# Patient Record
Sex: FEMALE | Race: ASIAN | NOT HISPANIC OR LATINO | URBAN - METROPOLITAN AREA
[De-identification: names, ages, dates, MRNs, and addresses within clinical notes are randomized per-mention and may not be internally consistent; named-entity substitution may affect disease eponyms.]

---

## 2024-01-09 ENCOUNTER — OFFICE VISIT (OUTPATIENT)
Dept: OPTOMETRY | Facility: CLINIC | Age: 29
End: 2024-01-09
Payer: COMMERCIAL

## 2024-01-09 DIAGNOSIS — Z98.890 HISTORY OF REPAIR OF RETINAL DEFECT BY LASER PHOTOCOAGULATION: ICD-10-CM

## 2024-01-09 DIAGNOSIS — H35.411 LATTICE DEGENERATION OF PERIPHERAL RETINA, RIGHT: ICD-10-CM

## 2024-01-09 DIAGNOSIS — H04.123 DRY EYE SYNDROME, BILATERAL: ICD-10-CM

## 2024-01-09 DIAGNOSIS — H52.13 MYOPIA, BILATERAL: Primary | ICD-10-CM

## 2024-01-09 PROCEDURE — 92015 DETERMINE REFRACTIVE STATE: CPT | Mod: S$GLB,,, | Performed by: OPTOMETRIST

## 2024-01-09 PROCEDURE — 99999 PR PBB SHADOW E&M-NEW PATIENT-LVL II: CPT | Mod: PBBFAC,,, | Performed by: OPTOMETRIST

## 2024-01-09 PROCEDURE — 92004 COMPRE OPH EXAM NEW PT 1/>: CPT | Mod: S$GLB,,, | Performed by: OPTOMETRIST

## 2024-01-09 RX ORDER — ACETAMINOPHEN AND CODEINE PHOSPHATE 120; 12 MG/5ML; MG/5ML
1 SOLUTION ORAL
COMMUNITY

## 2024-01-09 RX ORDER — CYCLOSPORINE 0.5 MG/ML
1 EMULSION OPHTHALMIC 2 TIMES DAILY
Qty: 180 EACH | Refills: 3 | Status: SHIPPED | OUTPATIENT
Start: 2024-01-09 | End: 2025-01-08

## 2024-01-09 NOTE — PROGRESS NOTES
HPI    RADHA: 7-8 months    CC: Pt is here today for a routine eye exam. Pt is here to establish care.   Pt states that she had been diagnosed with a a retinal disease had retinal   laser procedure done in [place].    (-)Dryness  (-)Burning  (+)Itchiness  (-)Tearing  (-)Flashes  (+)Floaters - occasionally  (-)Photophobia  (+)Eye Pain - occasionally      Diabetic: no    Contact Lens: no    Eye Meds: Refresh QD - PRN     Ocular History:  Retinal laser procedure done 1 year ago     Last edited by Kacey Gordon MA on 1/9/2024  8:58 AM.            Assessment /Plan     For exam results, see Encounter Report.    Myopia, bilateral   Rx specs    Lattice degeneration of peripheral retina, right  History of repair of retinal defect by laser photocoagulation     Dry eye syndrome, bilateral  -Start     cycloSPORINE (RESTASIS) 0.05 % ophthalmic emulsion; Place 1 drop into both eyes 2 (two) times daily.  Dispense: 180 each; Refill: 3      RTC 1 year, sooner PRN

## 2024-01-23 ENCOUNTER — OFFICE VISIT (OUTPATIENT)
Dept: DERMATOLOGY | Facility: CLINIC | Age: 29
End: 2024-01-23
Payer: COMMERCIAL

## 2024-01-23 DIAGNOSIS — L64.9 ANDROGENIC ALOPECIA: Primary | ICD-10-CM

## 2024-01-23 PROCEDURE — 99204 OFFICE O/P NEW MOD 45 MIN: CPT | Mod: S$GLB,,, | Performed by: DERMATOLOGY

## 2024-01-23 PROCEDURE — 1159F MED LIST DOCD IN RCRD: CPT | Mod: CPTII,S$GLB,, | Performed by: DERMATOLOGY

## 2024-01-23 PROCEDURE — 99999 PR PBB SHADOW E&M-EST. PATIENT-LVL III: CPT | Mod: PBBFAC,,, | Performed by: DERMATOLOGY

## 2024-01-23 RX ORDER — LEVOTHYROXINE SODIUM 25 UG/1
25 TABLET ORAL
COMMUNITY

## 2024-01-23 RX ORDER — SPIRONOLACTONE 50 MG/1
TABLET, FILM COATED ORAL
Qty: 60 TABLET | Refills: 3 | Status: SHIPPED | OUTPATIENT
Start: 2024-01-23 | End: 2024-06-18

## 2024-01-23 NOTE — PATIENT INSTRUCTIONS
Counseled the patient that hair loss is a long-term problem the 1st goal will be maintenance of hair present the 2nd goal will be hair growth  Start Men's otc Rogaine or minoxidil  once daily to scalp 5% solution or foam (can find 6 mo supply at [facility] or [employer])  Use vaseline around hairline to prevent excessive hair growth  Encourage natural (chemical and heat-free) hair styles and limited styling products.  Counseling done on chronic nature of hair loss and that at least a 6 month trial must be done before stopping minoxidil  Pictures taken of hair loss today will compare through visit hx     Discussed benefits and risks of therapy including but not limited to breakthrough bleeding, breast tenderness, and elevated potassium levels which may give symptoms of fatigue, palpitations, and nausea. Patient should limit potassium intake - avoid potassium supplements or salt substitutes, limit bananas and citrus fruits. Pregnancy must be avoided while taking spironolactone.      Viviscal shampoo and conditioner try to help with hair loss    [facility] and [employer]~ 17$ 6 mo supply

## 2024-01-23 NOTE — PROGRESS NOTES
"  Subjective:      Patient ID:  Susan Corona is a 28 y.o. female who presents for   Chief Complaint   Patient presents with    Hair Loss     Initial-scalp     28 y.o. female presents today for hair loss.    New patient    Hair loss- initial  Duration: 10 years  Itching (scale 1-10): none per pt  Current tx: Folimax scalp serum  Prior tx: same as current tx  Personal or family history of autoimmune disease (i.e. thyroid, lupus): hypothyroidism    Pertinent labs:  No results found for: "TSH"  No results found for: "WBC", "HGB", "HCT", "MCV", "PLT"    No results found for: "UIBC", "IRON", "TRANS", "TRANSFERRIN", "TIBC", "LABIRON", "FESATURATED"   No results found for: "ZINC", "[id number]"          Review of Systems   Hematologic/Lymphatic: Does not bruise/bleed easily.       Objective:   Physical Exam   Constitutional: She appears well-developed and well-nourished. No distress.   Neurological: She is alert and oriented to person, place, and time. She is not disoriented.   Psychiatric: She has a normal mood and affect.   Skin:   Areas Examined (abnormalities noted in diagram):   Scalp / Hair Palpated and Inspected            Diagram Legend     Erythematous scaling macule/papule c/w actinic keratosis       Vascular papule c/w angioma      Pigmented verrucoid papule/plaque c/w seborrheic keratosis      Yellow umbilicated papule c/w sebaceous hyperplasia      Irregularly shaped tan macule c/w lentigo     1-2 mm smooth white papules consistent with Milia      Movable subcutaneous cyst with punctum c/w epidermal inclusion cyst      Subcutaneous movable cyst c/w pilar cyst      Firm pink to brown papule c/w dermatofibroma      Pedunculated fleshy papule(s) c/w skin tag(s)      Evenly pigmented macule c/w junctional nevus     Mildly variegated pigmented, slightly irregular-bordered macule c/w mildly atypical nevus      Flesh colored to evenly pigmented papule c/w intradermal nevus       Pink pearly papule/plaque c/w basal " cell carcinoma      Erythematous hyperkeratotic cursted plaque c/w SCC      Surgical scar with no sign of skin cancer recurrence      Open and closed comedones      Inflammatory papules and pustules      Verrucoid papule consistent consistent with wart     Erythematous eczematous patches and plaques     Dystrophic onycholytic nail with subungual debris c/w onychomycosis     Umbilicated papule    Erythematous-base heme-crusted tan verrucoid plaque consistent with inflamed seborrheic keratosis     Erythematous Silvery Scaling Plaque c/w Psoriasis     See annotation      Assessment / Plan:        Androgenic alopecia  -     spironolactone (ALDACTONE) 50 MG tablet; Start with 1 po qday, increase to 2 po qday as tolerated  Dispense: 60 tablet; Refill: 3    Counseled the patient that hair loss is a long-term problem the 1st goal will be maintenance of hair present the 2nd goal will be hair growth  Start Men's otc Rogaine or minoxidil  once daily to scalp 5% solution or foam (can find 6 mo supply at [facility] or [employer])  Use vaseline around hairline to prevent excessive hair growth  Encourage natural (chemical and heat-free) hair styles and limited styling products.  Counseling done on chronic nature of hair loss and that at least a 6 month trial must be done before stopping minoxidil  Pictures taken of hair loss today will compare through visit hx     Discussed benefits and risks of therapy including but not limited to breakthrough bleeding, breast tenderness, and elevated potassium levels which may give symptoms of fatigue, palpitations, and nausea. Patient should limit potassium intake - avoid potassium supplements or salt substitutes, limit bananas and citrus fruits. Pregnancy must be avoided while taking spironolactone.      Viviscal shampoo and conditioner try to help with hair loss    All shampoo is sulfate and paraben free          Follow up in about 6 months (around 7/23/2024) for hair loss med mangement.

## 2024-04-10 ENCOUNTER — TELEPHONE (OUTPATIENT)
Dept: OPTOMETRY | Facility: CLINIC | Age: 29
End: 2024-04-10
Payer: COMMERCIAL

## 2024-04-10 DIAGNOSIS — H04.123 DRY EYE SYNDROME, BILATERAL: Primary | ICD-10-CM

## 2024-04-10 RX ORDER — CYCLOSPORINE 0.5 MG/ML
1 EMULSION OPHTHALMIC 2 TIMES DAILY
Qty: 180 EACH | Refills: 3 | Status: SHIPPED | OUTPATIENT
Start: 2024-04-10 | End: 2025-04-10

## 2024-04-10 NOTE — TELEPHONE ENCOUNTER
Spoke to pt in regards to eye pain. Informed pt that she needs to use the restasis drops that Dr. Jhaveri prescribed to her to improve her eye pain caused by her dry eyes. Pt expressed understanding and requested Rx be sent to [facility].

## 2024-05-16 ENCOUNTER — OFFICE VISIT (OUTPATIENT)
Dept: INTERNAL MEDICINE | Facility: CLINIC | Age: 29
End: 2024-05-16
Attending: FAMILY MEDICINE
Payer: COMMERCIAL

## 2024-05-16 ENCOUNTER — LAB VISIT (OUTPATIENT)
Dept: LAB | Facility: OTHER | Age: 29
End: 2024-05-16
Attending: FAMILY MEDICINE
Payer: COMMERCIAL

## 2024-05-16 VITALS
SYSTOLIC BLOOD PRESSURE: 142 MMHG | WEIGHT: 124.13 LBS | BODY MASS INDEX: 24.37 KG/M2 | HEART RATE: 92 BPM | DIASTOLIC BLOOD PRESSURE: 72 MMHG | HEIGHT: 60 IN | OXYGEN SATURATION: 100 %

## 2024-05-16 DIAGNOSIS — E03.9 ACQUIRED HYPOTHYROIDISM: ICD-10-CM

## 2024-05-16 DIAGNOSIS — Z00.00 PREVENTATIVE HEALTH CARE: Primary | ICD-10-CM

## 2024-05-16 DIAGNOSIS — Z00.00 PREVENTATIVE HEALTH CARE: ICD-10-CM

## 2024-05-16 LAB
ALBUMIN SERPL BCP-MCNC: 4.3 G/DL (ref 3.5–5.2)
ALP SERPL-CCNC: 31 U/L (ref 55–135)
ALT SERPL W/O P-5'-P-CCNC: 21 U/L (ref 10–44)
ANION GAP SERPL CALC-SCNC: 11 MMOL/L (ref 8–16)
AST SERPL-CCNC: 19 U/L (ref 10–40)
BILIRUB SERPL-MCNC: 0.6 MG/DL (ref 0.1–1)
BUN SERPL-MCNC: 10 MG/DL (ref 6–20)
CALCIUM SERPL-MCNC: 9.3 MG/DL (ref 8.7–10.5)
CHLORIDE SERPL-SCNC: 108 MMOL/L (ref 95–110)
CHOLEST SERPL-MCNC: 258 MG/DL (ref 120–199)
CHOLEST/HDLC SERPL: 7 {RATIO} (ref 2–5)
CO2 SERPL-SCNC: 22 MMOL/L (ref 23–29)
CREAT SERPL-MCNC: 0.7 MG/DL (ref 0.5–1.4)
EST. GFR  (NO RACE VARIABLE): >60 ML/MIN/1.73 M^2
ESTIMATED AVG GLUCOSE: 108 MG/DL (ref 68–131)
GLUCOSE SERPL-MCNC: 68 MG/DL (ref 70–110)
HAV IGM SERPL QL IA: NORMAL
HBA1C MFR BLD: 5.4 % (ref 4–5.6)
HBV CORE IGM SERPL QL IA: NORMAL
HBV SURFACE AG SERPL QL IA: NORMAL
HCV AB SERPL QL IA: NEGATIVE
HDLC SERPL-MCNC: 37 MG/DL (ref 40–75)
HDLC SERPL: 14.3 % (ref 20–50)
HIV 1+2 AB+HIV1 P24 AG SERPL QL IA: NEGATIVE
LDLC SERPL CALC-MCNC: 162.4 MG/DL (ref 63–159)
NONHDLC SERPL-MCNC: 221 MG/DL
POTASSIUM SERPL-SCNC: 4 MMOL/L (ref 3.5–5.1)
PROT SERPL-MCNC: 7.8 G/DL (ref 6–8.4)
SODIUM SERPL-SCNC: 141 MMOL/L (ref 136–145)
TREPONEMA PALLIDUM IGG+IGM AB [PRESENCE] IN SERUM OR PLASMA BY IMMUNOASSAY: NONREACTIVE
TRIGL SERPL-MCNC: 293 MG/DL (ref 30–150)
TSH SERPL DL<=0.005 MIU/L-ACNC: 2.26 UIU/ML (ref 0.4–4)

## 2024-05-16 PROCEDURE — 87389 HIV-1 AG W/HIV-1&-2 AB AG IA: CPT | Performed by: FAMILY MEDICINE

## 2024-05-16 PROCEDURE — 3008F BODY MASS INDEX DOCD: CPT | Mod: CPTII,S$GLB,, | Performed by: FAMILY MEDICINE

## 2024-05-16 PROCEDURE — 83036 HEMOGLOBIN GLYCOSYLATED A1C: CPT | Performed by: FAMILY MEDICINE

## 2024-05-16 PROCEDURE — 99999 PR PBB SHADOW E&M-EST. PATIENT-LVL III: CPT | Mod: PBBFAC,,, | Performed by: FAMILY MEDICINE

## 2024-05-16 PROCEDURE — 84443 ASSAY THYROID STIM HORMONE: CPT | Performed by: FAMILY MEDICINE

## 2024-05-16 PROCEDURE — 36415 COLL VENOUS BLD VENIPUNCTURE: CPT | Performed by: FAMILY MEDICINE

## 2024-05-16 PROCEDURE — 3077F SYST BP >= 140 MM HG: CPT | Mod: CPTII,S$GLB,, | Performed by: FAMILY MEDICINE

## 2024-05-16 PROCEDURE — 3078F DIAST BP <80 MM HG: CPT | Mod: CPTII,S$GLB,, | Performed by: FAMILY MEDICINE

## 2024-05-16 PROCEDURE — 80061 LIPID PANEL: CPT | Performed by: FAMILY MEDICINE

## 2024-05-16 PROCEDURE — 1159F MED LIST DOCD IN RCRD: CPT | Mod: CPTII,S$GLB,, | Performed by: FAMILY MEDICINE

## 2024-05-16 PROCEDURE — 80074 ACUTE HEPATITIS PANEL: CPT | Performed by: FAMILY MEDICINE

## 2024-05-16 PROCEDURE — 80053 COMPREHEN METABOLIC PANEL: CPT | Performed by: FAMILY MEDICINE

## 2024-05-16 PROCEDURE — 86593 SYPHILIS TEST NON-TREP QUANT: CPT | Performed by: FAMILY MEDICINE

## 2024-05-16 PROCEDURE — 3044F HG A1C LEVEL LT 7.0%: CPT | Mod: CPTII,S$GLB,, | Performed by: FAMILY MEDICINE

## 2024-05-16 PROCEDURE — 1160F RVW MEDS BY RX/DR IN RCRD: CPT | Mod: CPTII,S$GLB,, | Performed by: FAMILY MEDICINE

## 2024-05-16 PROCEDURE — 99385 PREV VISIT NEW AGE 18-39: CPT | Mod: S$GLB,,, | Performed by: FAMILY MEDICINE

## 2024-05-16 NOTE — PROGRESS NOTES
CHIEF COMPLAINT: Establish a primary care physician    HISTORY OF PRESENT ILLNESS: The patient is a generally healthy 28 year-old female.  The patient has no specific complaints today.  It has been a while since the patient has seen a primary care physician.  The patient wishes to establish a primary care physician.  The patient would also like to get some basic blood work done.    Hypothyroid on low-dose of Synthroid    REVIEW OF SYSTEMS:  GENERAL: No fever, chills, fatigability or weight loss.  SKIN: No rashes, itching or changes in color or texture of skin.  HEAD: No headaches or recent head trauma.  EYES: Visual acuity fine. No photophobia, ocular pain or diplopia.  EARS: Denies ear pain, discharge or vertigo.  NOSE: No loss of smell, no epistaxis or postnasal drip.  MOUTH & THROAT: No hoarseness or change in voice. No excessive gum bleeding.  NODES: Denies swollen glands.  CHEST: Denies DEAN, cyanosis, wheezing, cough and sputum production.  CARDIOVASCULAR: Denies chest pain, PND, orthopnea or reduced exercise tolerance.  ABDOMEN: Appetite fine. No weight loss. Denies diarrhea, abdominal pain, hematemesis or blood in stool.  URINARY: No flank pain, dysuria or hematuria.  PERIPHERAL VASCULAR: No claudication or cyanosis.  MUSCULOSKELETAL: No joint stiffness or swelling. Denies back pain.  NEUROLOGIC: No history of seizures, paralysis, alteration of gait or coordination.    SOCIAL HISTORY: The patient does not smoke.  The patient consumes alcohol socially.  The patient is single.  Med surg RN here    PHYSICAL EXAMINATION:   Blood pressure (!) 142/72, pulse 92, height 5' (1.524 m), weight 56.3 kg (124 lb 1.9 oz), SpO2 100%.  APPEARANCE: Well nourished, well developed, in no acute distress.    HEAD: Normocephalic, atraumatic.  EYES: PERRL. EOMI.  Conjunctivae without injection and  anicteric  NOSE: Mucosa pink. Airway clear.  MOUTH & THROAT: No tonsillar enlargement. No pharyngeal erythema or exudate. No  stridor.  NECK: Supple.   NODES: No cervical, axillary or inguinal lymph node enlargement.  CHEST: Lungs clear to auscultation.  No retractions are noted.  No rales or rhonchi are present.  CARDIOVASCULAR: Normal S1, S2. No rubs, murmurs or gallops.  ABDOMEN: Bowel sounds normal. Not distended. Soft. No tenderness or masses.  No ascites is noted.  MUSCULOSKELETAL:  There is no clubbing, cyanosis, or edema of the extremities x4.  There is full range of motion of the lumbar spine.  There is full range of motion of the extremities x4.  There is no deformity noted.    NEUROLOGIC:       Normal speech development.      Hearing normal.      Normal gait.      Motor and sensory exams grossly normal.  PSYCHIATRIC: Patient is alert and oriented x3.  Thought processes are all normal.  There is no homicidality.  There is no suicidality.  There is no evidence of psychosis.    LABORATORY/RADIOLOGY:   Chart reviewed.  We will update blood work today.    ASSESSMENT:   Normal physical exam in an apparently healthy individual  Hypothyroid    PLAN:  We will follow-up blood work which we expect to be normal.    Would like to stop Synthroid and get BW in a couple months    Return to clinic in one year.

## 2024-06-17 ENCOUNTER — LAB VISIT (OUTPATIENT)
Dept: LAB | Facility: OTHER | Age: 29
End: 2024-06-17
Attending: FAMILY MEDICINE
Payer: COMMERCIAL

## 2024-06-17 ENCOUNTER — OFFICE VISIT (OUTPATIENT)
Dept: INTERNAL MEDICINE | Facility: CLINIC | Age: 29
End: 2024-06-17
Attending: FAMILY MEDICINE
Payer: COMMERCIAL

## 2024-06-17 ENCOUNTER — OFFICE VISIT (OUTPATIENT)
Dept: OBSTETRICS AND GYNECOLOGY | Facility: CLINIC | Age: 29
End: 2024-06-17
Attending: FAMILY MEDICINE
Payer: COMMERCIAL

## 2024-06-17 VITALS
SYSTOLIC BLOOD PRESSURE: 100 MMHG | OXYGEN SATURATION: 99 % | HEIGHT: 60 IN | WEIGHT: 122.25 LBS | DIASTOLIC BLOOD PRESSURE: 60 MMHG | BODY MASS INDEX: 24 KG/M2 | HEART RATE: 92 BPM

## 2024-06-17 VITALS
HEIGHT: 60 IN | DIASTOLIC BLOOD PRESSURE: 62 MMHG | BODY MASS INDEX: 23.85 KG/M2 | WEIGHT: 121.5 LBS | SYSTOLIC BLOOD PRESSURE: 108 MMHG

## 2024-06-17 DIAGNOSIS — Z32.01 ENCOUNTER FOR PREGNANCY TEST, RESULT POSITIVE: ICD-10-CM

## 2024-06-17 DIAGNOSIS — O21.9 NAUSEA/VOMITING IN PREGNANCY: ICD-10-CM

## 2024-06-17 DIAGNOSIS — Z12.4 SCREENING FOR CERVICAL CANCER: ICD-10-CM

## 2024-06-17 DIAGNOSIS — N91.2 AMENORRHEA: ICD-10-CM

## 2024-06-17 DIAGNOSIS — Z01.419 ENCOUNTER FOR ANNUAL ROUTINE GYNECOLOGICAL EXAMINATION: Primary | ICD-10-CM

## 2024-06-17 DIAGNOSIS — Z01.419 ENCOUNTER FOR ANNUAL ROUTINE GYNECOLOGICAL EXAMINATION: ICD-10-CM

## 2024-06-17 DIAGNOSIS — Z34.90 PREGNANCY, UNSPECIFIED GESTATIONAL AGE: ICD-10-CM

## 2024-06-17 DIAGNOSIS — E03.9 ACQUIRED HYPOTHYROIDISM: Primary | ICD-10-CM

## 2024-06-17 DIAGNOSIS — E03.9 ACQUIRED HYPOTHYROIDISM: ICD-10-CM

## 2024-06-17 LAB
HCG INTACT+B SERPL-ACNC: NORMAL MIU/ML
TSH SERPL DL<=0.005 MIU/L-ACNC: 2.02 UIU/ML (ref 0.4–4)

## 2024-06-17 PROCEDURE — 3044F HG A1C LEVEL LT 7.0%: CPT | Mod: CPTII,S$GLB,, | Performed by: FAMILY MEDICINE

## 2024-06-17 PROCEDURE — 3044F HG A1C LEVEL LT 7.0%: CPT | Mod: CPTII,S$GLB,, | Performed by: OBSTETRICS & GYNECOLOGY

## 2024-06-17 PROCEDURE — 3078F DIAST BP <80 MM HG: CPT | Mod: CPTII,S$GLB,, | Performed by: FAMILY MEDICINE

## 2024-06-17 PROCEDURE — 3008F BODY MASS INDEX DOCD: CPT | Mod: CPTII,S$GLB,, | Performed by: OBSTETRICS & GYNECOLOGY

## 2024-06-17 PROCEDURE — 3074F SYST BP LT 130 MM HG: CPT | Mod: CPTII,S$GLB,, | Performed by: FAMILY MEDICINE

## 2024-06-17 PROCEDURE — 87491 CHLMYD TRACH DNA AMP PROBE: CPT | Performed by: OBSTETRICS & GYNECOLOGY

## 2024-06-17 PROCEDURE — 99999 PR PBB SHADOW E&M-EST. PATIENT-LVL III: CPT | Mod: PBBFAC,,, | Performed by: FAMILY MEDICINE

## 2024-06-17 PROCEDURE — 3078F DIAST BP <80 MM HG: CPT | Mod: CPTII,S$GLB,, | Performed by: OBSTETRICS & GYNECOLOGY

## 2024-06-17 PROCEDURE — 84702 CHORIONIC GONADOTROPIN TEST: CPT | Performed by: FAMILY MEDICINE

## 2024-06-17 PROCEDURE — 1160F RVW MEDS BY RX/DR IN RCRD: CPT | Mod: CPTII,S$GLB,, | Performed by: OBSTETRICS & GYNECOLOGY

## 2024-06-17 PROCEDURE — 3008F BODY MASS INDEX DOCD: CPT | Mod: CPTII,S$GLB,, | Performed by: FAMILY MEDICINE

## 2024-06-17 PROCEDURE — 99214 OFFICE O/P EST MOD 30 MIN: CPT | Mod: S$GLB,,, | Performed by: FAMILY MEDICINE

## 2024-06-17 PROCEDURE — 99385 PREV VISIT NEW AGE 18-39: CPT | Mod: S$GLB,,, | Performed by: OBSTETRICS & GYNECOLOGY

## 2024-06-17 PROCEDURE — 84443 ASSAY THYROID STIM HORMONE: CPT | Performed by: FAMILY MEDICINE

## 2024-06-17 PROCEDURE — 99999 PR PBB SHADOW E&M-EST. PATIENT-LVL III: CPT | Mod: PBBFAC,,, | Performed by: OBSTETRICS & GYNECOLOGY

## 2024-06-17 PROCEDURE — 1159F MED LIST DOCD IN RCRD: CPT | Mod: CPTII,S$GLB,, | Performed by: OBSTETRICS & GYNECOLOGY

## 2024-06-17 PROCEDURE — 36415 COLL VENOUS BLD VENIPUNCTURE: CPT | Performed by: FAMILY MEDICINE

## 2024-06-17 PROCEDURE — 3074F SYST BP LT 130 MM HG: CPT | Mod: CPTII,S$GLB,, | Performed by: OBSTETRICS & GYNECOLOGY

## 2024-06-17 NOTE — PROGRESS NOTES
CHIEF COMPLAINT:  f/u    HISTORY OF PRESENT ILLNESS: The patient is a generally healthy 28 year-old female.  The patient has no specific complaints today.  The patient would also like to get some repeat blood work done. She is approximately 6 weeks pregnant and concerned about her thyroid disease status.  Hypothyroid off of Synthroid for about a month.    REVIEW OF SYSTEMS:  GENERAL: No fever, chills, fatigability or weight loss.  SKIN: No rashes, itching or changes in color or texture of skin.  HEAD: No headaches or recent head trauma.  EYES: Visual acuity fine. No photophobia, ocular pain or diplopia.  EARS: Denies ear pain, discharge or vertigo.  NOSE: No loss of smell, no epistaxis or postnasal drip.  MOUTH & THROAT: No hoarseness or change in voice. No excessive gum bleeding.  NODES: Denies swollen glands.  CHEST: Denies DEAN, cyanosis, wheezing, cough and sputum production.  CARDIOVASCULAR: Denies chest pain, PND, orthopnea or reduced exercise tolerance.  ABDOMEN: Appetite fine. No weight loss. Denies diarrhea, abdominal pain, hematemesis or blood in stool.  URINARY: No flank pain, dysuria or hematuria.  PERIPHERAL VASCULAR: No claudication or cyanosis.  MUSCULOSKELETAL: No joint stiffness or swelling. Denies back pain.  NEUROLOGIC: No history of seizures, paralysis, alteration of gait or coordination.    SOCIAL HISTORY: The patient does not smoke.  The patient consumes alcohol socially.  The patient is single.  Med surg RN here    PHYSICAL EXAMINATION:   Blood pressure 100/60, pulse 92, height 5' (1.524 m), weight 55.4 kg (122 lb 3.9 oz), SpO2 99%.  APPEARANCE: Well nourished, well developed, in no acute distress.    HEAD: Normocephalic, atraumatic.  EYES: PERRL. EOMI.  Conjunctivae without injection and  anicteric  NOSE: Mucosa pink. Airway clear.  MOUTH & THROAT: No tonsillar enlargement. No pharyngeal erythema or exudate. No stridor.  NECK: Supple.   NODES: No cervical, axillary or inguinal lymph node  enlargement.  CHEST: Lungs clear to auscultation.  No retractions are noted.  No rales or rhonchi are present.  CARDIOVASCULAR: Normal S1, S2. No rubs, murmurs or gallops.  ABDOMEN: Bowel sounds normal. Not distended. Soft. No tenderness or masses.  No ascites is noted.  MUSCULOSKELETAL:  There is no clubbing, cyanosis, or edema of the extremities x4.  There is full range of motion of the lumbar spine.  There is full range of motion of the extremities x4.  There is no deformity noted.    NEUROLOGIC:       Normal speech development.      Hearing normal.      Normal gait.      Motor and sensory exams grossly normal.  PSYCHIATRIC: Patient is alert and oriented x3.  Thought processes are all normal.  There is no homicidality.  There is no suicidality.  There is no evidence of psychosis.    LABORATORY/RADIOLOGY:   Chart reviewed.  We will update blood work today.    ASSESSMENT:   IUP 6 weeks  Hypothyroid off Synthroid    PLAN:  OB referral  Repeat TSH is normal even off Synthroid  Return to clinic in one year.    Answers submitted by the patient for this visit:  Review of Systems Questionnaire (Submitted on 6/16/2024)  activity change: Yes  unexpected weight change: No  neck pain: No  hearing loss: No  rhinorrhea: No  trouble swallowing: No  eye discharge: No  visual disturbance: No  chest tightness: No  wheezing: No  chest pain: No  palpitations: No  blood in stool: No  constipation: No  vomiting: Yes  diarrhea: No  polydipsia: No  polyuria: No  difficulty urinating: No  hematuria: No  menstrual problem: No  dysuria: No  joint swelling: No  arthralgias: No  headaches: No  weakness: No  confusion: No  dysphoric mood: No

## 2024-06-18 LAB
C TRACH DNA SPEC QL NAA+PROBE: NOT DETECTED
N GONORRHOEA DNA SPEC QL NAA+PROBE: NOT DETECTED

## 2024-06-18 RX ORDER — PYRIDOXINE HCL (VITAMIN B6) 25 MG
25 TABLET ORAL NIGHTLY
Qty: 60 TABLET | Refills: 2 | Status: SHIPPED | OUTPATIENT
Start: 2024-06-18

## 2024-06-18 NOTE — PROGRESS NOTES
Subjective     Patient ID: Susan Corona is a 28 y.o. female.    Chief Complaint:  Well Woman      History of Present Illness  Gynecologic Exam  The patient's primary symptoms include missed menses. The patient's pertinent negatives include no genital itching, genital lesions, genital odor, genital rash, pelvic pain, vaginal bleeding or vaginal discharge. The current episode started in the past 7 days. She is pregnant. Associated symptoms include abdominal pain, anorexia, nausea and vomiting. Pertinent negatives include no back pain, chills, constipation, diarrhea, discolored urine, dysuria, fever, flank pain, frequency, headaches, hematuria, painful intercourse, rash or urgency. There has been no bleeding. She has not been passing clots. Her menstrual history has been regular. There is no history of an abdominal surgery, a  section, an ectopic pregnancy, endometriosis, a gynecological surgery, herpes simplex, menorrhagia, metrorrhagia, miscarriage, ovarian cysts, perineal abscess, PID, an STD, a terminated pregnancy or vaginosis.     Annual Exam-Premenopausal  Patient presents for annual exam. The patient has no complaints today. The patient is sexually active. GYN screening history: no prior history of gyn screening tests. The patient wears seatbelts: yes. The patient participates in regular exercise: yes. Has the patient ever been transfused or tattooed?: no. The patient reports that there is not domestic violence in her life.    Note menses late.  Not using contraception.  Having nausea.  GYN & OB History  Patient's last menstrual period was 2024 (exact date).   Date of Last Pap: 2024    OB History    Para Term  AB Living   1             SAB IAB Ectopic Multiple Live Births                  # Outcome Date GA Lbr Harshad/2nd Weight Sex Type Anes PTL Lv   1 Current                Review of Systems  Review of Systems   Constitutional:  Negative for activity change, appetite change,  chills, fatigue and fever.   HENT: Negative.  Negative for tinnitus.    Eyes: Negative.    Respiratory:  Negative for cough and shortness of breath.    Cardiovascular:  Negative for chest pain and palpitations.   Gastrointestinal:  Positive for abdominal pain, anorexia, nausea and vomiting. Negative for blood in stool, constipation and diarrhea.   Endocrine: Negative.  Negative for hot flashes.   Genitourinary:  Positive for missed menses. Negative for dysmenorrhea, dyspareunia, dysuria, flank pain, frequency, hematuria, menorrhagia, menstrual problem, pelvic pain, urgency, vaginal discharge, urinary incontinence and postcoital bleeding.   Musculoskeletal:  Negative for back pain and joint swelling.   Integumentary:  Negative for rash, breast mass, nipple discharge and breast skin changes.   Neurological: Negative.  Negative for headaches.   Hematological: Negative.  Does not bruise/bleed easily.   Psychiatric/Behavioral:  The patient is not nervous/anxious.    Breast: negative.  Negative for mass, nipple discharge and skin changes         Objective   Physical Exam:   Constitutional: Vital signs are normal. She appears well-developed and well-nourished. She is active and cooperative. She does not appear ill. No distress.    HENT:   Head: Normocephalic and atraumatic.   Mouth/Throat: Mucous membranes are normal.    Eyes: Pupils are equal, round, and reactive to light. Conjunctivae, EOM and lids are normal.    Neck: No thyroid mass and no thyromegaly present.    Cardiovascular:  Normal rate, regular rhythm, S1 normal, S2 normal and normal pulses.             Pulmonary/Chest: Effort normal. No accessory muscle usage. Right breast exhibits no inverted nipple, no mass, no nipple discharge, no skin change, no tenderness and no swelling. Left breast exhibits no inverted nipple, no mass, no nipple discharge, no skin change, no tenderness and no swelling.        Abdominal: Soft. Bowel sounds are normal. She exhibits no  distension and no mass. There is no abdominal tenderness. There is no rebound and no guarding.     Genitourinary:    Vagina and uterus normal.      Pelvic exam was performed with patient supine.   The external female genitalia was normal.     Labial bartholins normal.There is no tenderness or injury on the right labia. There is no tenderness or injury on the left labia. Cervix is normal. Right adnexum displays no mass and no fullness. Left adnexum displays no mass and no fullness. No erythema, vaginal discharge, rectocele or cystocele in the vagina. Cervix exhibits no motion tenderness and no discharge. Uterus is not deviated and not hosting fibroids. Normal urethral meatus.Urethra findings: no tendernessBladder findings: no bladder tenderness   Genitourinary Comments: A female chaperone was present for the entire exam                 Neurological: She is alert. She has normal strength.    Skin: Skin is warm and dry.    Psychiatric: She has a normal mood and affect. Her behavior is normal. Thought content normal. Her mood appears not anxious. Her affect is not inappropriate. Her speech is not slurred. She is not agitated and not aggressive. Thought content is not paranoid. She expresses no suicidal plans and no homicidal plans.            Assessment and Plan     1. Encounter for annual routine gynecological examination    2. Screening for cervical cancer    3. Amenorrhea    4. Nausea/vomiting in pregnancy             Plan:  Susan was seen today for well woman.    Diagnoses and all orders for this visit:    Encounter for annual routine gynecological examination  -     Ambulatory referral/consult to Obstetrics / Gynecology    Screening for cervical cancer  -     Liquid-Based Pap Smear, Screening    Amenorrhea  -     HIV 1/2 Ag/Ab (4th Gen); Future  -     Treponema Pallidium Antibodies IgG, IgM; Future  -     Hepatitis C Antibody; Future  -     CBC Without Differential; Future  -     Type & Screen; Future  -      Discontinue: doxylamine-pyridoxine, vit B6, (BONJESTA) 20-20 mg TbID; Take 1 tablet by mouth every evening. If still nauseated, take one in AM, one in PM.  -     US OB/GYN Procedure ([facility]); Future  -     C. trachomatis/N. gonorrhoeae by AMP DNA  -     Urine culture  -     Rubella Antibody, IgG; Future  -     Hepatitis B Surface Antigen; Future  -     Hemoglobin Electrophoresis,Hgb A2 Kofi.; Future    Nausea/vomiting in pregnancy  -     doxylamine succinate (UNISOM, DOXYLAMINE,) 25 mg tablet; Take 1 tablet (25 mg total) by mouth every evening.  -     pyridoxine, vitamin B6, (B-6) 25 MG Tab; Take 1 tablet (25 mg total) by mouth every evening.    Will get pt set up with partner for prenatal care.

## 2024-06-18 NOTE — PATIENT INSTRUCTIONS
"Use insect repellant when outdoors to protect you from mosquito borne illnesses, such as West Nile Virus and Encephalitis. You can use the "heavy duty" products, such as [place] Off or Cutter.     Over the Counter Medications that can be used when Pregnant  Benadryl Claritin Zyrtec  Robitussin - plain or DM  Tylenol - regular or extra strength - no more than 4grams/day, max 8 extra strength - ideally should be 2gms/day or less  Imodium - Regular or Advanced  Milk of Magnesia - avoid between 26-37 weeks of pregnancy  Colace Ducalax  Mylicon  Zantac Pepcid Complete Prevacid Nexium  Tums Rolaids  Monistat 7 day  Metamucil Fibercon  Miralax - up to 3 days of use at a time  Unisom  Regular Sudafed or Actifed - after 13 weeks of pregnancy - do not use if you have high blood pressure    Either Generic or Brand name medications are ok    Pregnancy Guidelines  1. Eat small, frequent meals. (6-8 little meals a day). This will help keep down nausea and ensure you and your baby are getting enough nutrients. Once morning sickness passes, try to limit intake of simple carbs (white bread, regular pasta, white rice, white potatoes, ) & consume complex carbs (whole grain bread, whole wheat pasta, brown rice, sweet potatoes ).  2. Eat protein every time you eat. This will give your baby essentials nutrients for development. In addition to keeping your blood sugar stable and avoids feeling hungry right after eating. Examples include: meat, eggs, cheese, peanut butter, beans, tofu or nuts.  3. Eat foods high in iron these include raisins, molasses, dried figs, parsley, liver and other organ meats, and dark, leafy green vegetables such as spinach.  4. Eat as many well-washed fresh, raw fruits & vegetables as possible. Cook foods yourself to avoid preservatives & unknown ingredients.  5. Drink plenty of water (6-8 8 ounce glasses per day). At least half your daily fluid intake should come from water.  6. Get good rest. Try to lie " down for an hour once/day and turn in 30 minutes to an hour earlier than your regular bedtime.  7. Continue to enjoy an active sex life throughout pregnancy, unless otherwise instructed.  8. Keep active. Get good exercise, while avoiding sit-ups and double lef-lifts. Walking, swimming, bike riding are excellent. Fresh air is important. If you are concerned about over-doing it, monitor your pulse while exercising, one it has been over 120 beats per minute x 20 minutes, decrease your activity until your heart beat is less than 120 beats per minute x 20 minutes, then you can speed up again if you wish. Try starting a walking program - begin with 3 days/week, 30 minutes each day, then increase to 5 days/week, then increase to 60 minutes each day. Walk briskly, your heart should beat a little faster, but you should not be out of breath.  9. Learn all you can about pregnancy & childbirth. A schedule of the classes is included in the hospital folder given at the first prenatal visit. Also, you can visit [email or web address] & select the section that says For Patients, you can scroll down & see info on a variety of topics. You can also visit [email or web address] & select the section that says For Women.  10. Regular prenatal care is important. Attend all scheduled prenatal visits.   11. Enjoy! This is a special time in your life.    Caution/Avoid    1. Caffeine: Moderately heavy use has been linked with increased risk of miscarriage, low birth weight & premature labor. Maximum recommended amount is 200mg/day, an 8 ounce cup of coffee is 94mg - that's half way there! Limit coffee, tea and elroy to 0 - 1 serving per day.  2. Alcohol & Street Drugs: There is no safe amount for either of these - they should both be avoided in pregnancy.  3. Smoking/Second-hand smoke: Has been linked with low infant birth weight & increased risk of respiratory problems in infants & children. Additionally, the risk of Sudden Infant Death Syndrome triples for  babies of mothers who smoke during pregnancy. This is a great time to quit! Call the [place] Tobacco Quitline at 1 800 quit now () or visit tobaccofreeliving.org  4. Low-nutrient/junk food. Soda, Candy, Chips, Cookies - eating for 2 doesn't mean eating everything in sight, it means getting good calories for your baby to grow up to be smart, fast & strong.  5. Medications unless advised by a health care provider. See the list of over the counter medications that can be used in pregnancy. It is NEVER a good idea to take medication that was prescribed to someone else.  6. Chemicals of all kinds: avoid strong-smelling household , hair coloring, straightening & curling agents, nail polish & remover and gasoline. If you must use these items, use them in a well-ventilated area, outside or next to a window with a fan.  7. Large ocean-going fish such as Sword Fish, Shark, Ceasar Mackerel, Tilefish & Tuna: they may contain mercury, which has been linked with neurological problems. If you must eat any of these, limit your intake to less than one serving per week.  8. Soft Cheeses: Can contain bacteria called Listeria. Avoid blue cheese, camembert, Feta & Mexican white cheese. Generally if it's been pasteurized, it should be safe.  9. Raw or undercooked meat, poultry or seafood: May contain bacteria or parasites. Cook ground beef all the way through, steak can be rare if you wish.  10. Cat litter & Bird Droppings: May contain toxoplasmosis. Wear gloves while gardening or digging in dirt where cats may defecated. Ideally, avoid exposure, ask someone else to change the cat litterbox while you're pregnant.  11. Aerosal sprays, such as hair spray. Use in a well-ventilated area.  12. Artificial colors, preservatives, additives & artificial sweeteners.

## 2024-06-20 ENCOUNTER — CLINICAL SUPPORT (OUTPATIENT)
Dept: OBSTETRICS AND GYNECOLOGY | Facility: CLINIC | Age: 29
End: 2024-06-20
Payer: COMMERCIAL

## 2024-06-20 DIAGNOSIS — N91.2 AMENORRHEA: Primary | ICD-10-CM

## 2024-06-22 ENCOUNTER — PATIENT MESSAGE (OUTPATIENT)
Dept: INTERNAL MEDICINE | Facility: CLINIC | Age: 29
End: 2024-06-22
Payer: COMMERCIAL

## 2024-06-22 ENCOUNTER — PATIENT MESSAGE (OUTPATIENT)
Dept: OBSTETRICS AND GYNECOLOGY | Facility: CLINIC | Age: 29
End: 2024-06-22
Payer: COMMERCIAL

## 2024-06-22 DIAGNOSIS — E03.9 ACQUIRED HYPOTHYROIDISM: Primary | ICD-10-CM

## 2024-06-24 RX ORDER — LEVOTHYROXINE SODIUM 25 UG/1
25 TABLET ORAL
Qty: 90 TABLET | Refills: 3 | Status: SHIPPED | OUTPATIENT
Start: 2024-06-24

## 2024-06-24 NOTE — TELEPHONE ENCOUNTER
No care due was identified.  [facility] Embedded Care Due Messages. Reference number: [id number].   6/24/2024 9:54:11 AM CDT

## 2024-07-11 ENCOUNTER — TELEPHONE (OUTPATIENT)
Dept: OBSTETRICS AND GYNECOLOGY | Facility: CLINIC | Age: 29
End: 2024-07-11
Payer: COMMERCIAL

## 2024-07-11 NOTE — TELEPHONE ENCOUNTER
Called pt. Informed that her pregnancy confirmation visit was scheduled incorrectly, so she will need to be r/s w a NP. Pt verbalized understanding. I r/s pt to Delia Acevedo on 7/15 [facility]. Offered to move u/s to Monday as well but pt declined. Pt would like to keep u/s on 7/12 and preg confirm on 7/15. Pt was satisfied w appts and verbalized understanding.

## 2024-07-12 ENCOUNTER — HOSPITAL ENCOUNTER (OUTPATIENT)
Dept: PERINATAL CARE | Facility: OTHER | Age: 29
Discharge: HOME | End: 2024-07-12
Attending: OBSTETRICS & GYNECOLOGY
Payer: COMMERCIAL

## 2024-07-12 DIAGNOSIS — N91.2 AMENORRHEA: ICD-10-CM

## 2024-07-12 PROCEDURE — 76801 OB US < 14 WKS SINGLE FETUS: CPT | Mod: 26,,, | Performed by: OBSTETRICS & GYNECOLOGY

## 2024-07-12 PROCEDURE — 76801 OB US < 14 WKS SINGLE FETUS: CPT

## 2024-07-15 ENCOUNTER — TELEPHONE (OUTPATIENT)
Dept: OBSTETRICS AND GYNECOLOGY | Facility: CLINIC | Age: 29
End: 2024-07-15

## 2024-07-15 ENCOUNTER — OFFICE VISIT (OUTPATIENT)
Dept: OBSTETRICS AND GYNECOLOGY | Facility: CLINIC | Age: 29
End: 2024-07-15
Payer: COMMERCIAL

## 2024-07-15 ENCOUNTER — PATIENT MESSAGE (OUTPATIENT)
Dept: MATERNAL FETAL MEDICINE | Facility: CLINIC | Age: 29
End: 2024-07-15
Payer: COMMERCIAL

## 2024-07-15 VITALS — SYSTOLIC BLOOD PRESSURE: 98 MMHG | BODY MASS INDEX: 22.86 KG/M2 | DIASTOLIC BLOOD PRESSURE: 75 MMHG | WEIGHT: 117.06 LBS

## 2024-07-15 DIAGNOSIS — N91.4 SECONDARY OLIGOMENORRHEA: Primary | ICD-10-CM

## 2024-07-15 DIAGNOSIS — Z32.01 POSITIVE PREGNANCY TEST: ICD-10-CM

## 2024-07-15 PROCEDURE — 99214 OFFICE O/P EST MOD 30 MIN: CPT | Mod: S$GLB,,, | Performed by: NURSE PRACTITIONER

## 2024-07-15 PROCEDURE — 3044F HG A1C LEVEL LT 7.0%: CPT | Mod: CPTII,S$GLB,, | Performed by: NURSE PRACTITIONER

## 2024-07-15 PROCEDURE — 3078F DIAST BP <80 MM HG: CPT | Mod: CPTII,S$GLB,, | Performed by: NURSE PRACTITIONER

## 2024-07-15 PROCEDURE — 1160F RVW MEDS BY RX/DR IN RCRD: CPT | Mod: CPTII,S$GLB,, | Performed by: NURSE PRACTITIONER

## 2024-07-15 PROCEDURE — 1159F MED LIST DOCD IN RCRD: CPT | Mod: CPTII,S$GLB,, | Performed by: NURSE PRACTITIONER

## 2024-07-15 PROCEDURE — 3008F BODY MASS INDEX DOCD: CPT | Mod: CPTII,S$GLB,, | Performed by: NURSE PRACTITIONER

## 2024-07-15 PROCEDURE — 99999 PR PBB SHADOW E&M-EST. PATIENT-LVL III: CPT | Mod: PBBFAC,,, | Performed by: NURSE PRACTITIONER

## 2024-07-15 PROCEDURE — 87086 URINE CULTURE/COLONY COUNT: CPT | Performed by: NURSE PRACTITIONER

## 2024-07-15 PROCEDURE — 3074F SYST BP LT 130 MM HG: CPT | Mod: CPTII,S$GLB,, | Performed by: NURSE PRACTITIONER

## 2024-07-15 NOTE — PATIENT INSTRUCTIONS
[facility], Labor and Delivery is on the 6th floor of [facility]: [phone]    [address] PHONE NUMBER, [phone] (OPEN MON-FRI, 8a-5p)    [email or web address]    Blood pressures to look out for:  Top number >140 OR bottom number>90  If elevated, wait 10-15minutes and then repeat  If still elevated, reach out to Doctor.  If top number >160 OR bottom >110, repeat  If still that high, proceed straight to the [facility]    1) Eat small frequent meals through the day versus three large meals  2) Try ginger ale or sprite to help settle the stomach   3) Eat crackers or dry toast before getting out of bed in the morning   4) Stay hydrated by drinking plenty of water-do not immediately eat or drink something after vomiting. Give your stomach a rest for 20-30 minutes. Slowly reintroduce ice chips, small sips water, crackers, etc.    5) Try OTC Unisom (use the tablets that have doxylamine not diphenhydramine in them, can use generic brands like Equate) and vitamin B6  (25 mg, can find on [employer]) together at night before bed. This can help with the nausea in the morning and is safe to use during pregnancy. You can also take the vitamin B6 alone, every 8 hours to help with the nausea.     If you are unable to keep anything down and constantly vomiting for more that 24 hours, let the office know so that dehydration can be avoided. We would recommend being seen in the emergency department if this is the case.     CHROMOSOMAL TESTING OPTIONS IN PREGNANCY    The sequential screen is a test done around 12-14 weeks that consists of an ultrasound that measures the nuchal fold (neck thickness) of baby and blood work on the same day. You get a second set of labs done a few weeks later after 15 weeks. Based on all three of these results, they are able to tell you if you are considered to be low risk or high risk regarding neural tube defects and chromosomal abnormalities like Down Syndrome. If you are at all interested, I  usually place the order today so we do not miss the window. Someone will give you a phone call in a week or two to schedule this. If you do not want it, just tell them no thank you. This test is typically covered by your insurance and is performed by the Maternal Fetal Medicine (MFM) department here at [facility]. It will not tell you the sex of the baby.  Call [phone] to see about coverage and any out of pocket costs regarding the Hbvaeprbf22 (MT21) testing- also known as cell free DNA testing (cfDNA). This can be done as early as 9 weeks in most individuals and is blood work only. We recommend waiting until 10 weeks to ensure the most accuracy. This test checks for any chromosomal abnormalities like Down Syndrome. You can also find out the sex of the baby if you choose to know. Once you find out coverage and decide to proceed, send either myself or your doctor a message and we can see what date you can do it. It is done at our second floor lab at [facility].

## 2024-07-15 NOTE — PROGRESS NOTES
CC: Positive Pregnancy Test    HISTORY OF PRESENT ILLNESS:    Susan Corona is a 28 y.o. female, ,  Presents today for a routine exam complaining of amenorrhea and positive home urine pregnancy test.  Patient's last menstrual period was 2024 (exact date). New to me, first pregnancy. Dating scan done last week- see results below. No bleeding or pain. Nausea and vomiting improving, not taking unisom/B6. Emesis once a day now- manageable. Interested in aneuploidy screening. IOB labs done in  with Dr. Benitez. PCP follows her thyroid. Fu with Dr. Bedolla.    No tobacco use  Denies hx of genital herpes  Taking synthroid and prenatal vitamins  No known FH of SC, CF, or known birth defects    Pregnancy   =========   [place] pregnancy. Number of fetuses: 1     Dating   ======   Ultrasound examination on: 2024   GA by U/S based upon: CRL   GA by U/S 10 w + 0 d   RHYS by U/S: 2025   Assigned: based on ultrasound (CRL), selected on 2024   Assigned GA 10 w + 0 d   Assigned RHYS: 2025     Assessment   ==========   Gestational sac: visualized   Location: intrauterine   Yolk sac: visualized   Amniotic sac: visualized   Embryo: visualized   CRL 31.2 mm 10w 0d [place]   Cardiac activity: present    bpm     ROS:  GENERAL: No weight changes. No swelling. No fatigue. No fever. Nausea with vomiting  CARDIOVASCULAR: No chest pain. No shortness of breath. No leg cramps.   NEUROLOGICAL: No headaches. No vision changes.  BREASTS: No pain. No lumps. No discharge.  ABDOMEN: No pain. No diarrhea. No constipation.  REPRODUCTIVE: No abnormal bleeding.   VULVA: No pain. No lesions. No itching.  VAGINA: No relaxation. No itching. No odor. No discharge. No lesions.  URINARY: No incontinence. No nocturia. No frequency. No dysuria.    MEDICATIONS AND ALLERGIES:  Reviewed    COMPREHENSIVE GYN HISTORY:  PAP History: Denies abnormal Paps.  Infection History: Denies STDs. Denies PID.  Benign History: Denies uterine  fibroids. Denies ovarian cysts. Denies endometriosis. Denies other conditions.  Cancer History: Denies cervical cancer. Denies uterine cancer or hyperplasia. Denies ovarian cancer. Denies vulvar cancer or pre-cancer. Denies vaginal cancer or pre-cancer. Denies breast cancer. Denies colon cancer.  Sexual Activity History: Reports currently being sexually active  Menstrual History: None.  Contraception: None    BP 98/75 (BP Location: Right arm, Patient Position: Sitting)   Wt 53.1 kg (117 lb 1 oz)   LMP 2024 (Exact Date)   BMI 22.86 kg/m²     PE:  AFFECT: Calm, alert and oriented X 3. Interactive during exam  GENERAL: Appears well-nourished, well-developed, in no acute distress.  HEAD: Normocephalic, atruamatic  TEETH: Good dentition.  THYROID: No thyromegally     PROCEDURES:  UPT Positive    ASSESSMENT/PLAN:  Amenorrhea  Positive urine pregnancy test   -  Routine prenatal care    Nausea and vomiting in pregnancy    -  Education regarding lifestyle and dietary modifications    -  Advised use of B6/Unisom. Pt will notify us if no relief/worsening symptoms, will consider Zofran if needed.    1st TRIMESTER COUNSELING: Discussed all, booklet provided:  Common complaints of pregnancy  HIV and other routine prenatal tests including  genetic screening  Risk factors identified by prenatal history  Oriented to practice - discussed anticipated course of prenatal care & indications for Ultrasound  Childbirth classes/Hospital facilities   Nutrition and weight gain counseling  Toxoplasmosis precautions (Cats/Raw Meat)  Sexual activity and exercise  Environmental/Work hazards  Travel  Tobacco (Ask, Advise, Assess, Assist, and Arrange), as well as alcohol and drug use  Use of any medications (Including supplements, Vitamins, Herbs, or OTC Drugs)  Domestic violence  Seat belt use      TERATOLOGY COUNSELING:   Discussed indications and options for aneuploidy screening - pamphlets given    -  Pt desires mt21 if  covered    FOLLOW-UP in 4 weeks with Dr. Bedolla  Pap current  GC neg  Urine cx pending  IOB labs and US reviewed  NT ordered    Delia Acevedo NP  OB/GYN

## 2024-07-16 LAB — BACTERIA UR CULT: NO GROWTH

## 2024-07-25 ENCOUNTER — PATIENT MESSAGE (OUTPATIENT)
Dept: OBSTETRICS AND GYNECOLOGY | Facility: CLINIC | Age: 29
End: 2024-07-25
Payer: COMMERCIAL

## 2024-08-05 ENCOUNTER — INITIAL PRENATAL (OUTPATIENT)
Dept: OBSTETRICS AND GYNECOLOGY | Facility: CLINIC | Age: 29
End: 2024-08-05
Payer: COMMERCIAL

## 2024-08-05 ENCOUNTER — PROCEDURE VISIT (OUTPATIENT)
Dept: MATERNAL FETAL MEDICINE | Facility: CLINIC | Age: 29
End: 2024-08-05
Payer: COMMERCIAL

## 2024-08-05 ENCOUNTER — PATIENT MESSAGE (OUTPATIENT)
Dept: ADMINISTRATIVE | Facility: OTHER | Age: 29
End: 2024-08-05
Payer: COMMERCIAL

## 2024-08-05 VITALS
SYSTOLIC BLOOD PRESSURE: 110 MMHG | BODY MASS INDEX: 22.39 KG/M2 | DIASTOLIC BLOOD PRESSURE: 60 MMHG | WEIGHT: 114.63 LBS

## 2024-08-05 DIAGNOSIS — Z32.01 POSITIVE PREGNANCY TEST: ICD-10-CM

## 2024-08-05 DIAGNOSIS — Z36.89 ENCOUNTER FOR FETAL ANATOMIC SURVEY: Primary | ICD-10-CM

## 2024-08-05 DIAGNOSIS — E03.9 HYPOTHYROIDISM, UNSPECIFIED TYPE: ICD-10-CM

## 2024-08-05 DIAGNOSIS — Z34.91 INITIAL OBSTETRIC VISIT, FIRST TRIMESTER: Primary | ICD-10-CM

## 2024-08-05 DIAGNOSIS — Z3A.13 13 WEEKS GESTATION OF PREGNANCY: ICD-10-CM

## 2024-08-05 PROCEDURE — 0500F INITIAL PRENATAL CARE VISIT: CPT | Mod: CPTII,S$GLB,, | Performed by: OBSTETRICS & GYNECOLOGY

## 2024-08-05 PROCEDURE — 99999 PR PBB SHADOW E&M-EST. PATIENT-LVL II: CPT | Mod: PBBFAC,,, | Performed by: OBSTETRICS & GYNECOLOGY

## 2024-08-05 PROCEDURE — 76813 OB US NUCHAL MEAS 1 GEST: CPT | Mod: S$GLB,,, | Performed by: OBSTETRICS & GYNECOLOGY

## 2024-08-23 ENCOUNTER — PATIENT MESSAGE (OUTPATIENT)
Dept: OTHER | Facility: OTHER | Age: 29
End: 2024-08-23
Payer: COMMERCIAL

## 2024-08-30 ENCOUNTER — PATIENT MESSAGE (OUTPATIENT)
Dept: OTHER | Facility: OTHER | Age: 29
End: 2024-08-30
Payer: COMMERCIAL

## 2024-09-05 ENCOUNTER — ROUTINE PRENATAL (OUTPATIENT)
Dept: OBSTETRICS AND GYNECOLOGY | Facility: CLINIC | Age: 29
End: 2024-09-05
Payer: COMMERCIAL

## 2024-09-05 VITALS
WEIGHT: 120.13 LBS | SYSTOLIC BLOOD PRESSURE: 107 MMHG | BODY MASS INDEX: 23.47 KG/M2 | DIASTOLIC BLOOD PRESSURE: 66 MMHG

## 2024-09-05 DIAGNOSIS — Z3A.17 17 WEEKS GESTATION OF PREGNANCY: Primary | ICD-10-CM

## 2024-09-05 PROCEDURE — 99999 PR PBB SHADOW E&M-EST. PATIENT-LVL III: CPT | Mod: PBBFAC,,, | Performed by: NURSE PRACTITIONER

## 2024-09-05 NOTE — PATIENT INSTRUCTIONS
[employer]    Tdap or Dtap for close family if not had in the past five years    [facility], Labor and Delivery is on the 6th floor of [facility]: [phone]    [address] PHONE NUMBER, [phone] (OPEN MON-FRI, 8a-5p)    [email or web address]    Blood pressures to look out for:  Top number >140 OR bottom number>90  If elevated, wait 10-15minutes and then repeat  If still elevated, reach out to Doctor.  If top number >160 OR bottom >110, repeat  If still that high, proceed straight to the [facility]

## 2024-09-05 NOTE — PROGRESS NOTES
Here for routine OB appt at 17w6d, with no complaints.  Denies VB and cramping.  Pain, bleeding, and PTL precautions given.  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 Will discuss with manager about lighter load. Discussed compression stockings and maternity belt.  RTC @ 24 weeks

## 2024-09-09 ENCOUNTER — PROCEDURE VISIT (OUTPATIENT)
Dept: MATERNAL FETAL MEDICINE | Facility: CLINIC | Age: 29
End: 2024-09-09
Payer: COMMERCIAL

## 2024-09-09 DIAGNOSIS — Z36.89 ENCOUNTER FOR FETAL ANATOMIC SURVEY: ICD-10-CM

## 2024-09-09 PROCEDURE — 76805 OB US >/= 14 WKS SNGL FETUS: CPT | Mod: S$GLB,,, | Performed by: OBSTETRICS & GYNECOLOGY

## 2024-09-20 ENCOUNTER — PATIENT MESSAGE (OUTPATIENT)
Dept: OTHER | Facility: OTHER | Age: 29
End: 2024-09-20
Payer: COMMERCIAL

## 2024-10-04 ENCOUNTER — ROUTINE PRENATAL (OUTPATIENT)
Dept: OBSTETRICS AND GYNECOLOGY | Facility: CLINIC | Age: 29
End: 2024-10-04
Payer: COMMERCIAL

## 2024-10-04 VITALS
SYSTOLIC BLOOD PRESSURE: 112 MMHG | BODY MASS INDEX: 24.89 KG/M2 | DIASTOLIC BLOOD PRESSURE: 60 MMHG | WEIGHT: 127.44 LBS

## 2024-10-04 DIAGNOSIS — Z3A.22 22 WEEKS GESTATION OF PREGNANCY: ICD-10-CM

## 2024-10-04 DIAGNOSIS — E03.9 HYPOTHYROIDISM, UNSPECIFIED TYPE: ICD-10-CM

## 2024-10-04 DIAGNOSIS — Z34.02 ENCOUNTER FOR SUPERVISION OF NORMAL FIRST PREGNANCY IN SECOND TRIMESTER: Primary | ICD-10-CM

## 2024-10-04 PROCEDURE — 99999 PR PBB SHADOW E&M-EST. PATIENT-LVL III: CPT | Mod: PBBFAC,,, | Performed by: OBSTETRICS & GYNECOLOGY

## 2024-10-04 NOTE — PROGRESS NOTES
@22w0d: Doing well, denies CTX, LOF, VB. +FM.   Glucose, CBC, and TSH with next visit.  Anatomy done and WNL.  Continue synthroid 25 mcg.  Continue ASA 81 mg and PNV with iron.   Plans to get flu shot. Unsure about COVID vaccine. Counseling provided.   RTC 4 weeks OB f/u. SAB precautions reviewed.

## 2024-10-18 ENCOUNTER — PATIENT MESSAGE (OUTPATIENT)
Dept: OTHER | Facility: OTHER | Age: 29
End: 2024-10-18
Payer: COMMERCIAL

## 2024-10-25 ENCOUNTER — PATIENT MESSAGE (OUTPATIENT)
Dept: OBSTETRICS AND GYNECOLOGY | Facility: CLINIC | Age: 29
End: 2024-10-25
Payer: COMMERCIAL

## 2024-10-28 ENCOUNTER — TELEPHONE (OUTPATIENT)
Dept: OBSTETRICS AND GYNECOLOGY | Facility: CLINIC | Age: 29
End: 2024-10-28
Payer: COMMERCIAL

## 2024-10-28 ENCOUNTER — PATIENT MESSAGE (OUTPATIENT)
Dept: OBSTETRICS AND GYNECOLOGY | Facility: CLINIC | Age: 29
End: 2024-10-28
Payer: COMMERCIAL

## 2024-11-01 ENCOUNTER — PATIENT MESSAGE (OUTPATIENT)
Dept: OTHER | Facility: OTHER | Age: 29
End: 2024-11-01
Payer: COMMERCIAL

## 2024-11-01 ENCOUNTER — ROUTINE PRENATAL (OUTPATIENT)
Dept: OBSTETRICS AND GYNECOLOGY | Facility: CLINIC | Age: 29
End: 2024-11-01
Payer: COMMERCIAL

## 2024-11-01 ENCOUNTER — LAB VISIT (OUTPATIENT)
Dept: LAB | Facility: OTHER | Age: 29
End: 2024-11-01
Attending: OBSTETRICS & GYNECOLOGY
Payer: COMMERCIAL

## 2024-11-01 VITALS
BODY MASS INDEX: 26.48 KG/M2 | DIASTOLIC BLOOD PRESSURE: 62 MMHG | WEIGHT: 135.56 LBS | SYSTOLIC BLOOD PRESSURE: 110 MMHG

## 2024-11-01 DIAGNOSIS — O99.810 ABNORMAL GLUCOSE TOLERANCE IN PREGNANCY: ICD-10-CM

## 2024-11-01 DIAGNOSIS — Z34.02 ENCOUNTER FOR SUPERVISION OF NORMAL FIRST PREGNANCY IN SECOND TRIMESTER: ICD-10-CM

## 2024-11-01 DIAGNOSIS — Z3A.26 26 WEEKS GESTATION OF PREGNANCY: ICD-10-CM

## 2024-11-01 DIAGNOSIS — Z23 NEED FOR DIPHTHERIA-TETANUS-PERTUSSIS (TDAP) VACCINE: ICD-10-CM

## 2024-11-01 DIAGNOSIS — E03.9 HYPOTHYROIDISM, UNSPECIFIED TYPE: ICD-10-CM

## 2024-11-01 DIAGNOSIS — O99.012 ANEMIA DURING PREGNANCY IN SECOND TRIMESTER: ICD-10-CM

## 2024-11-01 DIAGNOSIS — E03.9 HYPOTHYROIDISM, UNSPECIFIED TYPE: Primary | ICD-10-CM

## 2024-11-01 LAB
BASOPHILS # BLD AUTO: 0.04 K/UL (ref 0–0.2)
BASOPHILS NFR BLD: 0.4 % (ref 0–1.9)
DIFFERENTIAL METHOD BLD: ABNORMAL
EOSINOPHIL # BLD AUTO: 0.1 K/UL (ref 0–0.5)
EOSINOPHIL NFR BLD: 1.4 % (ref 0–8)
ERYTHROCYTE [DISTWIDTH] IN BLOOD BY AUTOMATED COUNT: 13.5 % (ref 11.5–14.5)
GLUCOSE SERPL-MCNC: 173 MG/DL (ref 70–140)
HCT VFR BLD AUTO: 32.9 % (ref 37–48.5)
HGB BLD-MCNC: 10.7 G/DL (ref 12–16)
IMM GRANULOCYTES # BLD AUTO: 0.24 K/UL (ref 0–0.04)
IMM GRANULOCYTES NFR BLD AUTO: 2.6 % (ref 0–0.5)
LYMPHOCYTES # BLD AUTO: 2.2 K/UL (ref 1–4.8)
LYMPHOCYTES NFR BLD: 24.2 % (ref 18–48)
MCH RBC QN AUTO: 30.1 PG (ref 27–31)
MCHC RBC AUTO-ENTMCNC: 32.5 G/DL (ref 32–36)
MCV RBC AUTO: 93 FL (ref 82–98)
MONOCYTES # BLD AUTO: 0.6 K/UL (ref 0.3–1)
MONOCYTES NFR BLD: 6.7 % (ref 4–15)
NEUTROPHILS # BLD AUTO: 6 K/UL (ref 1.8–7.7)
NEUTROPHILS NFR BLD: 64.7 % (ref 38–73)
NRBC BLD-RTO: 0 /100 WBC
PLATELET # BLD AUTO: 271 K/UL (ref 150–450)
PMV BLD AUTO: 10.4 FL (ref 9.2–12.9)
RBC # BLD AUTO: 3.55 M/UL (ref 4–5.4)
TSH SERPL DL<=0.005 MIU/L-ACNC: 1.62 UIU/ML (ref 0.4–4)
WBC # BLD AUTO: 9.24 K/UL (ref 3.9–12.7)

## 2024-11-01 PROCEDURE — 99999 PR PBB SHADOW E&M-EST. PATIENT-LVL III: CPT | Mod: PBBFAC,,, | Performed by: OBSTETRICS & GYNECOLOGY

## 2024-11-01 PROCEDURE — 84443 ASSAY THYROID STIM HORMONE: CPT | Performed by: OBSTETRICS & GYNECOLOGY

## 2024-11-01 PROCEDURE — 36415 COLL VENOUS BLD VENIPUNCTURE: CPT | Performed by: OBSTETRICS & GYNECOLOGY

## 2024-11-01 PROCEDURE — 85025 COMPLETE CBC W/AUTO DIFF WBC: CPT | Performed by: OBSTETRICS & GYNECOLOGY

## 2024-11-01 PROCEDURE — 82950 GLUCOSE TEST: CPT | Performed by: OBSTETRICS & GYNECOLOGY

## 2024-11-01 RX ORDER — FERROUS SULFATE 325(65) MG
325 TABLET, DELAYED RELEASE (ENTERIC COATED) ORAL DAILY
Qty: 90 TABLET | Refills: 3 | Status: SHIPPED | OUTPATIENT
Start: 2024-11-01

## 2024-11-04 ENCOUNTER — TELEPHONE (OUTPATIENT)
Dept: OBSTETRICS AND GYNECOLOGY | Facility: CLINIC | Age: 29
End: 2024-11-04
Payer: COMMERCIAL

## 2024-11-04 ENCOUNTER — LAB VISIT (OUTPATIENT)
Dept: LAB | Facility: OTHER | Age: 29
End: 2024-11-04
Attending: OBSTETRICS & GYNECOLOGY
Payer: COMMERCIAL

## 2024-11-04 ENCOUNTER — PATIENT MESSAGE (OUTPATIENT)
Dept: OBSTETRICS AND GYNECOLOGY | Facility: CLINIC | Age: 29
End: 2024-11-04
Payer: COMMERCIAL

## 2024-11-04 DIAGNOSIS — O99.810 ABNORMAL GLUCOSE TOLERANCE IN PREGNANCY: ICD-10-CM

## 2024-11-04 DIAGNOSIS — O24.410 DIET CONTROLLED GESTATIONAL DIABETES MELLITUS (GDM) IN SECOND TRIMESTER: Primary | ICD-10-CM

## 2024-11-04 LAB
GLUCOSE SERPL-MCNC: 135 MG/DL
GLUCOSE SERPL-MCNC: 187 MG/DL
GLUCOSE SERPL-MCNC: 196 MG/DL
GLUCOSE SERPL-MCNC: 72 MG/DL (ref 70–110)

## 2024-11-04 PROCEDURE — 36415 COLL VENOUS BLD VENIPUNCTURE: CPT | Performed by: OBSTETRICS & GYNECOLOGY

## 2024-11-04 PROCEDURE — 82952 GTT-ADDED SAMPLES: CPT | Performed by: OBSTETRICS & GYNECOLOGY

## 2024-11-04 RX ORDER — INSULIN PUMP SYRINGE, 3 ML
EACH MISCELLANEOUS
Qty: 1 EACH | Refills: 0 | Status: SHIPPED | OUTPATIENT
Start: 2024-11-04

## 2024-11-04 RX ORDER — LANCETS 28 GAUGE
EACH MISCELLANEOUS
Qty: 100 EACH | Refills: 11 | Status: SHIPPED | OUTPATIENT
Start: 2024-11-04

## 2024-11-04 NOTE — TELEPHONE ENCOUNTER
Called pt and lvm in regards to scheduling an appt w . asked if she could come in the afternoon of 11/15 [facility]. Asked her to us back at [phone] or portal message so we can get her scheduled

## 2024-11-08 ENCOUNTER — TELEPHONE (OUTPATIENT)
Dept: OBSTETRICS AND GYNECOLOGY | Facility: CLINIC | Age: 29
End: 2024-11-08
Payer: COMMERCIAL

## 2024-11-08 ENCOUNTER — CLINICAL SUPPORT (OUTPATIENT)
Dept: DIABETES | Facility: CLINIC | Age: 29
End: 2024-11-08
Payer: COMMERCIAL

## 2024-11-08 DIAGNOSIS — O24.410 DIET CONTROLLED GESTATIONAL DIABETES MELLITUS (GDM) IN SECOND TRIMESTER: ICD-10-CM

## 2024-11-08 PROCEDURE — 99999 PR PBB SHADOW E&M-EST. PATIENT-LVL II: CPT | Mod: PBBFAC,,, | Performed by: DIETITIAN, REGISTERED

## 2024-11-08 PROCEDURE — G0108 DIAB MANAGE TRN  PER INDIV: HCPCS | Mod: S$GLB,,, | Performed by: DIETITIAN, REGISTERED

## 2024-11-15 ENCOUNTER — PATIENT MESSAGE (OUTPATIENT)
Dept: OTHER | Facility: OTHER | Age: 29
End: 2024-11-15
Payer: COMMERCIAL

## 2024-11-15 ENCOUNTER — ROUTINE PRENATAL (OUTPATIENT)
Dept: OBSTETRICS AND GYNECOLOGY | Facility: CLINIC | Age: 29
End: 2024-11-15
Payer: COMMERCIAL

## 2024-11-15 ENCOUNTER — CLINICAL SUPPORT (OUTPATIENT)
Dept: OBSTETRICS AND GYNECOLOGY | Facility: CLINIC | Age: 29
End: 2024-11-15
Payer: COMMERCIAL

## 2024-11-15 VITALS — DIASTOLIC BLOOD PRESSURE: 56 MMHG | SYSTOLIC BLOOD PRESSURE: 98 MMHG | BODY MASS INDEX: 26.61 KG/M2 | WEIGHT: 136.25 LBS

## 2024-11-15 DIAGNOSIS — Z34.03 ENCOUNTER FOR SUPERVISION OF NORMAL FIRST PREGNANCY IN THIRD TRIMESTER: Primary | ICD-10-CM

## 2024-11-15 DIAGNOSIS — E03.9 HYPOTHYROIDISM, UNSPECIFIED TYPE: ICD-10-CM

## 2024-11-15 DIAGNOSIS — Z3A.28 28 WEEKS GESTATION OF PREGNANCY: ICD-10-CM

## 2024-11-15 DIAGNOSIS — Z23 NEED FOR DIPHTHERIA-TETANUS-PERTUSSIS (TDAP) VACCINE: ICD-10-CM

## 2024-11-15 DIAGNOSIS — O24.410 DIET CONTROLLED GESTATIONAL DIABETES MELLITUS (GDM) IN THIRD TRIMESTER: ICD-10-CM

## 2024-11-15 PROCEDURE — 99999 PR PBB SHADOW E&M-EST. PATIENT-LVL III: CPT | Mod: PBBFAC,,, | Performed by: OBSTETRICS & GYNECOLOGY

## 2024-11-15 PROCEDURE — 99999 PR PBB SHADOW E&M-EST. PATIENT-LVL I: CPT | Mod: PBBFAC,,,

## 2024-11-15 NOTE — PROGRESS NOTES
@28w0d: Doing well, denies CTX, LOF, VB. +FM.   GDM - diet controlled.   Fastings: 68-93, PP B: ; PP L: ; PP D . Continue accuchecks and send glucose logs via the portal weekly. <50% abnormal.  Will need 32 week growth US. Order placed today.  Tdap scheduled next week, but patient willing to do today.   Hypothyroid - TSH 1.618 at last visit. Will repeat around 32-34 weeks with 3T labs.   Breast feeding discussed, patient is interested. Pump Rx done.  Give pediatrician list next time.   RTC 2 weeks OB f/u. PTL/PPROM/PreE/FM precautions reviewed.

## 2024-11-15 NOTE — PROGRESS NOTES
Diabetes Care Specialist Progress Note  Author: Lilly Regalado RD, CDE  Date: 11/15/2024    Intake    Program Intake  Reason for Diabetes Program Visit:: Initial Diabetes Assessment  Current diabetes risk level:: low  In the last month, have you used the ER or been admitted to the hospital: No  Permission to speak with others about care:: yes    Current Diabetes Treatment: Diet/Exercise  Diet/Exercise Type/Dose: gdm    Continuous Glucose Monitoring  Patient has CGM: No    Lab Results   Component Value Date    HGBA1C 5.4 05/16/2024         Lifestyle Coping Support & Clinical    Lifestyle/Coping/Support  Does anyone in your family have diabetes or does anyone in your family support you in your diabetes care?:   Learning Barriers:: None  Culture or Buddhist beliefs that may impact ability to access healthcare: No  Psychosocial/Coping Skills Assessment Completed: : No  Deffered due to:: Time  Area of need?: Deferred         Diabetes Self-Management Skills Assessment    Medication Skills Assessment  Patient is able to identify current diabetes medications, dosages, and appropriate timing of medications.:  (not currently on dm meds)  Medication Skills Assessment Completed:: Yes  Assessment indicates:: Instruction Needed  Area of need?: Yes    Diabetes Disease Process/Treatment Options  Diabetes Type?: Gestational  When were you diagnosed?: new dx  If previous diabetes education, when/where:: none  What are your goals for this education session?: learn about eating habits  Is patient aware of what causes diabetes?: No  Does patient understand the pathophysiology of diabetes?: No  Diabetes Disease Process/Treatment Options: Skills Assessment Completed: Yes  Assessment indicates:: Instruction Needed  Area of need?: Yes    Nutrition/Healthy Eating  Meal Plan 24 Hour Recall - Breakfast: white bread, peanut butter, eggs, tea with sugar  Meal Plan 24 Hour Recall - Lunch: rice, protein  Meal Plan 24 Hour Recall -  Dinner: rice and potato, meat, spinach  Meal Plan 24 Hour Recall - Snack: fruit, chips, nuts, occaisionally stefanie chip cookies  Meal Plan 24 Hour Recall - Beverage: water, fruit juice  Who shops/cooks?: self,   Patient can identify foods that impact blood sugar.: no (see comments)  Challenges to healthy eating:: portion control, snacking between meals and at night  Nutrition/Healthy Eating Skills Assessment Completed:: Yes  Assessment indicates:: Instruction Needed  Area of need?: Yes    Physical Activity/Exercise  Patient's daily activity level:: lightly active  Patient formally exercises outside of work.: yes  Frequency: four or more times a week (walking)  Patient can identify forms of physical activity.: yes  Physical Activity/Exercise Skills Assessment Completed: : Yes  Assessment indicates:: Instruction Needed  Area of need?: Yes    Home Blood Glucose Monitoring  Patient states that blood sugar is checked at home daily.: yes  Monitoring Method:: home glucometer  Fasting BG range history:: 79, 84  Postmeal BG range history:: 83, 107, 105  How often do you check your blood sugar?: started with 4x/day testing yesterday  Home Blood Glucose Monitoring Skills Assessment Completed: : Yes  Assessment indicates:: Instruction Needed  Area of need?: Yes    Acute Complications  Have you ever had hypoglycemia (low BG 70 or less)?: no  Acute Complications Skills Assessment Completed: : Yes  Assessment indicates:: Instruction Needed  Area of need?: Yes    Chronic Complications  Reviewed health maintenance: yes  Chronic Complications Skills Assessment Completed: : Yes  Assessment indicates:: Instruction Needed  Area of need?: Yes      Assessment Summary and Plan    Based on today's diabetes care assessment, the following areas of need were identified:      Identified Areas of Need        Areas of Need   Lifestyle/Coping/Support deferred   Medications Yes:     Ed on potential meds used to control BG in pregnancy    Diabetes Disease Process/Treatment Options Yes:     Ed on what GDM is and how it evolves throughout pregnancy    Ed on insulin resistance and cho intolerance     Ed on importance of using diet, exercise and lifestyle changes to control BG during pregnancy   Nutrition/Healthy Eating Yes: see care planning   Physical Activity/Exercise Yes:     Ed on ADA recs for physical activity and safety considerations during pregnancy   Home Blood Glucose Monitoring Yes: see care planning   Acute Complications Yes:     Ed on hypoglycemia:  What is considered a low BG  Ed on s/s of low BG and how to prevent and treat during pregnancy     Ed on GDM related complications and when to seek medical attention       Chronic Complications  Yes:     Ed on long term complications r/t GDM, especially increased risk of developing Type II DM later on in life  Stressed importance of getting screened annually for diabetes w/ PCP        Today's interventions were provided through individual discussion, instruction, and written materials were provided.      Patient verbalized understanding of instruction and written materials.  Pt was able to return back demonstration of instructions today. Patient understood key points, needs reinforcement and further instruction.     Diabetes Self-Management Care Plan:    Today's Diabetes Self-Management Care Plan was developed with Susan's input. Susan has agreed to work toward the following goal(s) to improve his/her overall diabetes control.      Care Plan: Diabetes Management   Updates made since 11/16/2023 12:00 AM        Problem: Healthy Eating         Goal: Eat 30-45g/2-3 servings of Carbohydrate at breakfast and 45-60g/3-4 servings of carbohydrate at lunch and dinner.    Start Date: 11/8/2024   Expected End Date: 2/7/2025   Priority: Medium   Barriers: No Barriers Identified   Note:    11/8/24 - Pt has some understanding of what foods are carbs but needs comprehensive ed. Provided education on sources  of carbohydrate, choosing more complex/high fiber carbs vs simple carbs, portion sizes using dry measuring cups and food models for visual aid, label reading, and balancing meals with lean protein and non-starchy vegetables. Reviewed meal planning, plate method, and went over some examples.        Task: Reviewed the sources and role of Carbohydrate, Protein, and Fat and how each nutrient impacts blood sugar. Completed 11/15/2024        Task: Provided visual examples using dry measuring cups, food models, and other familiar objects such as computer mouse, deck or cards, tennis ball etc. to help with visualization of portions. Completed 11/15/2024        Task: Recommended replacing beverages containing high sugar content with noncaloric/sugar free options and/or water. Completed 11/15/2024        Task: Review the importance of balancing carbohydrates with each meal using portion control techniques to count servings of carbohydrate and label reading to identify serving size and amount of total carbs per serving. Completed 11/15/2024        Task: Provided Sample plate method and reviewed the use of the plate to estimate amounts of carbohydrate per meal. Completed 11/15/2024        Problem: Blood Glucose Self-Monitoring         Goal: Patient agrees to check and record blood sugars 4 times per day.    Start Date: 11/8/2024   Expected End Date: 2/7/2025   Priority: High   Barriers: No Barriers Identified   Note:    11/8/24 - Pt started testing yesterday and brought log today. All readings so far within goal. Reviewed goal BGs during pregnancy, continuing to test throughout remainder of pregnancy, bringing log to all appts, and when to contact MD sooner with trend of out of range BG.        Task: Reviewed the importance of self-monitoring blood glucose and keeping logs. Completed 11/15/2024        Task: Provided patient with blood glucose logs, reviewed appropriate timing and frequency to SMBG, education on parameters on  when to notify provider and advised patient to bring logs to all appts with PCP/Endocrinologist/Diabetes Care Specialist. Completed 11/15/2024        Task: Discussed ways to minimize pain when monitoring blood glucose. Completed 11/15/2024          Follow Up Plan     Follow up with questions or if needing further gdm support.    Today's care plan and follow up schedule was discussed with patient.  Susan verbalized understanding of the care plan, goals, and agrees to follow up plan.        The patient was encouraged to communicate with his/her health care provider/physician and care team regarding his/her condition(s) and treatment.  I provided the patient with my contact information today and encouraged to contact me via phone or [facility] Patient Portal as needed.     Length of Visit   Total Time: 60 Minutes

## 2024-11-15 NOTE — PROGRESS NOTES
Patient reports for Tdap injection. Patient without complaint of pain at this time, injection given. Tolerated well no pain noted post injection advised to wait in lobby 15 minutes and report any adverse reactions.          Site:LD

## 2024-11-18 ENCOUNTER — PATIENT MESSAGE (OUTPATIENT)
Dept: MATERNAL FETAL MEDICINE | Facility: CLINIC | Age: 29
End: 2024-11-18
Payer: COMMERCIAL

## 2024-11-20 ENCOUNTER — PATIENT MESSAGE (OUTPATIENT)
Dept: OBSTETRICS AND GYNECOLOGY | Facility: CLINIC | Age: 29
End: 2024-11-20
Payer: COMMERCIAL

## 2024-11-21 ENCOUNTER — TELEPHONE (OUTPATIENT)
Dept: OBSTETRICS AND GYNECOLOGY | Facility: CLINIC | Age: 29
End: 2024-11-21
Payer: COMMERCIAL

## 2024-11-21 NOTE — TELEPHONE ENCOUNTER
Called pt. Offered her to move her appt earlier to 9:00am on 11/25 because  had an opening, if not she can still keep her original time. Pt stated she would like the 9:00am on 11/25 [facility]. Pt satisfied w appt and verbalized understanding.

## 2024-11-25 ENCOUNTER — ROUTINE PRENATAL (OUTPATIENT)
Dept: OBSTETRICS AND GYNECOLOGY | Facility: CLINIC | Age: 29
End: 2024-11-25
Payer: COMMERCIAL

## 2024-11-25 VITALS — WEIGHT: 138.25 LBS | SYSTOLIC BLOOD PRESSURE: 106 MMHG | BODY MASS INDEX: 27 KG/M2 | DIASTOLIC BLOOD PRESSURE: 60 MMHG

## 2024-11-25 DIAGNOSIS — Z34.03 ENCOUNTER FOR SUPERVISION OF NORMAL FIRST PREGNANCY IN THIRD TRIMESTER: Primary | ICD-10-CM

## 2024-11-25 DIAGNOSIS — Z3A.29 29 WEEKS GESTATION OF PREGNANCY: ICD-10-CM

## 2024-11-25 DIAGNOSIS — E03.9 HYPOTHYROIDISM, UNSPECIFIED TYPE: ICD-10-CM

## 2024-11-25 DIAGNOSIS — O24.410 DIET CONTROLLED GESTATIONAL DIABETES MELLITUS (GDM) IN THIRD TRIMESTER: ICD-10-CM

## 2024-11-25 PROCEDURE — 99999 PR PBB SHADOW E&M-EST. PATIENT-LVL III: CPT | Mod: PBBFAC,,, | Performed by: OBSTETRICS & GYNECOLOGY

## 2024-11-25 NOTE — PROGRESS NOTES
@29w3d: Doing well, denies CTX, LOF, VB. +FM.   GDM Diet controlled: Fastings all < 95; postprandial only 5 out of 26 >120. Good control. Has growth US scheduled next week.  [employer] forms completed and sent back to [facility].   Tdap done. May consider RSV vaccine between 32-36 weeks, continue to discuss.   Pump Rx done.   Peds list given.  Discuss pregnancy classes next visit.  3T labs with TSH around 34 weeks.   RTC 2 weeks OB f/u. PPROM/PTL/PreE/FM precautions reviewed.

## 2024-11-29 ENCOUNTER — PATIENT MESSAGE (OUTPATIENT)
Dept: OTHER | Facility: OTHER | Age: 29
End: 2024-11-29
Payer: COMMERCIAL

## 2024-12-02 ENCOUNTER — PROCEDURE VISIT (OUTPATIENT)
Dept: MATERNAL FETAL MEDICINE | Facility: CLINIC | Age: 29
End: 2024-12-02
Payer: COMMERCIAL

## 2024-12-02 DIAGNOSIS — O24.410 DIET CONTROLLED GESTATIONAL DIABETES MELLITUS (GDM) IN THIRD TRIMESTER: ICD-10-CM

## 2024-12-02 PROCEDURE — 76816 OB US FOLLOW-UP PER FETUS: CPT | Mod: S$GLB,,, | Performed by: OBSTETRICS & GYNECOLOGY

## 2024-12-09 ENCOUNTER — PATIENT MESSAGE (OUTPATIENT)
Dept: OBSTETRICS AND GYNECOLOGY | Facility: CLINIC | Age: 29
End: 2024-12-09
Payer: COMMERCIAL

## 2024-12-13 ENCOUNTER — PATIENT MESSAGE (OUTPATIENT)
Dept: OTHER | Facility: OTHER | Age: 29
End: 2024-12-13
Payer: COMMERCIAL

## 2024-12-16 ENCOUNTER — LAB VISIT (OUTPATIENT)
Dept: LAB | Facility: OTHER | Age: 29
End: 2024-12-16
Attending: OBSTETRICS & GYNECOLOGY
Payer: COMMERCIAL

## 2024-12-16 ENCOUNTER — TELEPHONE (OUTPATIENT)
Dept: OBSTETRICS AND GYNECOLOGY | Facility: CLINIC | Age: 29
End: 2024-12-16

## 2024-12-16 ENCOUNTER — ROUTINE PRENATAL (OUTPATIENT)
Dept: OBSTETRICS AND GYNECOLOGY | Facility: CLINIC | Age: 29
End: 2024-12-16
Payer: COMMERCIAL

## 2024-12-16 VITALS
DIASTOLIC BLOOD PRESSURE: 60 MMHG | BODY MASS INDEX: 27.13 KG/M2 | SYSTOLIC BLOOD PRESSURE: 108 MMHG | WEIGHT: 138.88 LBS

## 2024-12-16 DIAGNOSIS — E03.9 HYPOTHYROIDISM, UNSPECIFIED TYPE: ICD-10-CM

## 2024-12-16 DIAGNOSIS — Z34.03 ENCOUNTER FOR SUPERVISION OF NORMAL FIRST PREGNANCY IN THIRD TRIMESTER: ICD-10-CM

## 2024-12-16 DIAGNOSIS — O24.410 DIET CONTROLLED GESTATIONAL DIABETES MELLITUS (GDM) IN THIRD TRIMESTER: ICD-10-CM

## 2024-12-16 DIAGNOSIS — Z34.03 ENCOUNTER FOR SUPERVISION OF NORMAL FIRST PREGNANCY IN THIRD TRIMESTER: Primary | ICD-10-CM

## 2024-12-16 DIAGNOSIS — Z3A.32 32 WEEKS GESTATION OF PREGNANCY: ICD-10-CM

## 2024-12-16 LAB
BASOPHILS # BLD AUTO: 0.03 K/UL (ref 0–0.2)
BASOPHILS NFR BLD: 0.3 % (ref 0–1.9)
DIFFERENTIAL METHOD BLD: ABNORMAL
EOSINOPHIL # BLD AUTO: 0.1 K/UL (ref 0–0.5)
EOSINOPHIL NFR BLD: 0.9 % (ref 0–8)
ERYTHROCYTE [DISTWIDTH] IN BLOOD BY AUTOMATED COUNT: 13.2 % (ref 11.5–14.5)
FERRITIN SERPL-MCNC: 24 NG/ML (ref 20–300)
HCT VFR BLD AUTO: 37 % (ref 37–48.5)
HGB BLD-MCNC: 12 G/DL (ref 12–16)
HIV 1+2 AB+HIV1 P24 AG SERPL QL IA: NEGATIVE
IMM GRANULOCYTES # BLD AUTO: 0.09 K/UL (ref 0–0.04)
IMM GRANULOCYTES NFR BLD AUTO: 1 % (ref 0–0.5)
IRON SERPL-MCNC: 57 UG/DL (ref 30–160)
LYMPHOCYTES # BLD AUTO: 1.9 K/UL (ref 1–4.8)
LYMPHOCYTES NFR BLD: 21.8 % (ref 18–48)
MCH RBC QN AUTO: 29.9 PG (ref 27–31)
MCHC RBC AUTO-ENTMCNC: 32.4 G/DL (ref 32–36)
MCV RBC AUTO: 92 FL (ref 82–98)
MONOCYTES # BLD AUTO: 0.6 K/UL (ref 0.3–1)
MONOCYTES NFR BLD: 7.1 % (ref 4–15)
NEUTROPHILS # BLD AUTO: 6.1 K/UL (ref 1.8–7.7)
NEUTROPHILS NFR BLD: 68.9 % (ref 38–73)
NRBC BLD-RTO: 0 /100 WBC
PLATELET # BLD AUTO: 258 K/UL (ref 150–450)
PMV BLD AUTO: 10.4 FL (ref 9.2–12.9)
RBC # BLD AUTO: 4.01 M/UL (ref 4–5.4)
SATURATED IRON: 11 % (ref 20–50)
TOTAL IRON BINDING CAPACITY: 496 UG/DL (ref 250–450)
TRANSFERRIN SERPL-MCNC: 335 MG/DL (ref 200–375)
TREPONEMA PALLIDUM IGG+IGM AB [PRESENCE] IN SERUM OR PLASMA BY IMMUNOASSAY: NONREACTIVE
TSH SERPL DL<=0.005 MIU/L-ACNC: 1.31 UIU/ML (ref 0.4–4)
WBC # BLD AUTO: 8.79 K/UL (ref 3.9–12.7)

## 2024-12-16 PROCEDURE — 0502F SUBSEQUENT PRENATAL CARE: CPT | Mod: CPTII,S$GLB,, | Performed by: OBSTETRICS & GYNECOLOGY

## 2024-12-16 PROCEDURE — 85025 COMPLETE CBC W/AUTO DIFF WBC: CPT | Performed by: OBSTETRICS & GYNECOLOGY

## 2024-12-16 PROCEDURE — 86593 SYPHILIS TEST NON-TREP QUANT: CPT | Performed by: OBSTETRICS & GYNECOLOGY

## 2024-12-16 PROCEDURE — 84443 ASSAY THYROID STIM HORMONE: CPT | Performed by: OBSTETRICS & GYNECOLOGY

## 2024-12-16 PROCEDURE — 36415 COLL VENOUS BLD VENIPUNCTURE: CPT | Performed by: OBSTETRICS & GYNECOLOGY

## 2024-12-16 PROCEDURE — 99999 PR PBB SHADOW E&M-EST. PATIENT-LVL III: CPT | Mod: PBBFAC,,, | Performed by: OBSTETRICS & GYNECOLOGY

## 2024-12-16 PROCEDURE — 87389 HIV-1 AG W/HIV-1&-2 AB AG IA: CPT | Performed by: OBSTETRICS & GYNECOLOGY

## 2024-12-16 PROCEDURE — 83540 ASSAY OF IRON: CPT | Performed by: OBSTETRICS & GYNECOLOGY

## 2024-12-16 PROCEDURE — 82728 ASSAY OF FERRITIN: CPT | Performed by: OBSTETRICS & GYNECOLOGY

## 2024-12-16 NOTE — TELEPHONE ENCOUNTER
Spoke w pt. Informed that her 1/3 appt is scheduled for 2:45pm but there was only a 2:30pm available so the portal will state that. Explained we know her appt is for 2:45pm, so she can arrive then. Pt verbalized understanding.

## 2024-12-16 NOTE — PROGRESS NOTES
@32w3d: Doing well, denies CTX, LOF, VB. +FM.   GDM Diet controlled: Fastings all < 95. 2 abnormal post lunch, 1 abnormal post dinner, discussed mid morning and mid afternoon snack for increased hunger. Can also try low-glycemic index bread like Jas bread in the mornings or with lunch instead of rice.   Growth US done. repeat at 36 weeks per [facility]. Order placed today.   [facility] forms completed and sent back to [facility].   Tdap done. May consider RSV vaccine between 32-36 weeks, continue to discuss.   Pump Rx done.   Peds list given.  Doing pregnancy classes.  3T labs with TSH today. Will get iron studies for mild anemia.  Mild HA, occasional. Normal BP and no swelling. Some breast milk production. Recommend good hydration, Tylenol, can consider Magnesium Oxide 400 mg PRN.    RTC 2 weeks OB f/u. PTL/PPROM/PreE/FM precautions reviewed.

## 2025-01-02 ENCOUNTER — PROCEDURE VISIT (OUTPATIENT)
Dept: MATERNAL FETAL MEDICINE | Facility: CLINIC | Age: 30
End: 2025-01-02
Payer: COMMERCIAL

## 2025-01-02 DIAGNOSIS — O24.410 DIET CONTROLLED GESTATIONAL DIABETES MELLITUS (GDM) IN THIRD TRIMESTER: ICD-10-CM

## 2025-01-02 DIAGNOSIS — E03.9 HYPOTHYROIDISM, UNSPECIFIED TYPE: ICD-10-CM

## 2025-01-02 PROCEDURE — 76816 OB US FOLLOW-UP PER FETUS: CPT | Mod: S$GLB,,, | Performed by: OBSTETRICS & GYNECOLOGY

## 2025-01-03 ENCOUNTER — PATIENT MESSAGE (OUTPATIENT)
Dept: OTHER | Facility: OTHER | Age: 30
End: 2025-01-03
Payer: COMMERCIAL

## 2025-01-03 ENCOUNTER — ROUTINE PRENATAL (OUTPATIENT)
Dept: OBSTETRICS AND GYNECOLOGY | Facility: CLINIC | Age: 30
End: 2025-01-03
Payer: COMMERCIAL

## 2025-01-03 VITALS — BODY MASS INDEX: 27.56 KG/M2 | WEIGHT: 141.13 LBS | SYSTOLIC BLOOD PRESSURE: 92 MMHG | DIASTOLIC BLOOD PRESSURE: 58 MMHG

## 2025-01-03 DIAGNOSIS — Z3A.35 35 WEEKS GESTATION OF PREGNANCY: ICD-10-CM

## 2025-01-03 DIAGNOSIS — O24.410 DIET CONTROLLED GESTATIONAL DIABETES MELLITUS (GDM) IN THIRD TRIMESTER: ICD-10-CM

## 2025-01-03 DIAGNOSIS — E03.9 HYPOTHYROIDISM, UNSPECIFIED TYPE: ICD-10-CM

## 2025-01-03 DIAGNOSIS — Z34.03 ENCOUNTER FOR SUPERVISION OF NORMAL FIRST PREGNANCY IN THIRD TRIMESTER: Primary | ICD-10-CM

## 2025-01-03 PROCEDURE — 99999 PR PBB SHADOW E&M-EST. PATIENT-LVL III: CPT | Mod: PBBFAC,,, | Performed by: OBSTETRICS & GYNECOLOGY

## 2025-01-03 PROCEDURE — 0502F SUBSEQUENT PRENATAL CARE: CPT | Mod: CPTII,S$GLB,, | Performed by: OBSTETRICS & GYNECOLOGY

## 2025-01-03 NOTE — PROGRESS NOTES
@35w0d: Doing well, denies CTX, LOF, VB. +FM.   GDM - diet controlled, only 4 postprandial elevations (<50%), otherwise normal. Fastings all normal. Continue sending weekly values via the portal when not seen in clinic.   Growth scan done 1/2/25 (@34w) showed EFW 2519g 29% AC 55%.  Discussed induction. Patient is hesitant about induction and desires spontaneous labor. Will continue to discuss. Per ACOG recommend delivery by 40w6d, however I discussed with patient the declining vaginal delivery success rate if induction happens too far past RHYS. Will continue to discuss. Questions answered to patient and 's apparent satisfaction.   Still considering RSV vaccine.   Tdap done.   Pump sent.   Pediatrician?  RTC 2 weeks OB f/u. PTL/PPROM/PreE/FM precautions reviewed.

## 2025-01-06 ENCOUNTER — HOSPITAL ENCOUNTER (EMERGENCY)
Facility: OTHER | Age: 30
Discharge: HOME | End: 2025-01-06
Attending: OBSTETRICS & GYNECOLOGY
Payer: COMMERCIAL

## 2025-01-06 VITALS
RESPIRATION RATE: 18 BRPM | TEMPERATURE: 98 F | DIASTOLIC BLOOD PRESSURE: 60 MMHG | OXYGEN SATURATION: 99 % | HEART RATE: 78 BPM | SYSTOLIC BLOOD PRESSURE: 100 MMHG

## 2025-01-06 DIAGNOSIS — Z3A.35 35 WEEKS GESTATION OF PREGNANCY: ICD-10-CM

## 2025-01-06 DIAGNOSIS — N93.9 VAGINAL SPOTTING: Primary | ICD-10-CM

## 2025-01-06 PROCEDURE — 99284 EMERGENCY DEPT VISIT MOD MDM: CPT | Mod: 25,,, | Performed by: OBSTETRICS & GYNECOLOGY

## 2025-01-06 PROCEDURE — 59025 FETAL NON-STRESS TEST: CPT | Mod: 26,,, | Performed by: OBSTETRICS & GYNECOLOGY

## 2025-01-06 PROCEDURE — 59025 FETAL NON-STRESS TEST: CPT

## 2025-01-06 PROCEDURE — 76815 OB US LIMITED FETUS(S): CPT | Mod: 26,,, | Performed by: OBSTETRICS & GYNECOLOGY

## 2025-01-06 PROCEDURE — 99284 EMERGENCY DEPT VISIT MOD MDM: CPT | Mod: 25

## 2025-01-06 NOTE — Clinical Note
"Ssuan "Russell Corona was seen and treated in our emergency department on 1/6/2025.  She may return to work on 01/07/2025.       If you have any questions or concerns, please don't hesitate to call.      Sadaf Antony MD"

## 2025-01-06 NOTE — ED PROVIDER NOTES
Encounter Date: 2025       History     Chief Complaint   Patient presents with    Vaginal Bleeding     (Spotting)     Susan Corona is a 29 y.o. F at 35w3d presents complaining of vaginal bleeding.   This IUP is complicated by GDM (diet controlled), hypothyroidism.  Patient denies contractions, reports vaginal bleeding, denies LOF.   Fetal Movement: decreased    Patient reports episode of vaginal spotting that began this morning. She noticed bright red blood while wiping followed by a few spots on her pad. She denies recent sexual activity. 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   She reports decreased fetal movement since this morning, but has not done kick counts. She has felt the baby move this morning and while being in the [facility].   She denies fevers, chills, abdominal pain, N/V, dysuria.           Review of patient's allergies indicates:   Allergen Reactions    Minoxidil Rash     No past medical history on file.  No past surgical history on file.  Family History   Problem Relation Name Age of Onset    Breast cancer Neg Hx      Colon cancer Neg Hx      Ovarian cancer Neg Hx       Social History     Tobacco Use    Smoking status: Never     Passive exposure: Never    Smokeless tobacco: Never   Substance Use Topics    Alcohol use: Not Currently    Drug use: Never     Review of Systems   Constitutional:  Negative for chills and fatigue.   HENT:  Negative for congestion and sore throat.    Eyes:  Negative for visual disturbance.   Respiratory:  Negative for chest tightness and shortness of breath.    Cardiovascular:  Negative for chest pain and leg swelling.   Gastrointestinal:  Negative for abdominal pain.   Genitourinary:  Positive for vaginal bleeding. Negative for dysuria and vaginal discharge.   Neurological:  Negative for headaches.       Physical Exam     Initial Vitals [25 0705]   BP Pulse Resp Temp SpO2   106/68 73 18 97.6 °F (36.4 °C) 100 %       MAP       --         Physical Exam    Vitals reviewed.  Constitutional: She appears well-developed and well-nourished. No distress.   HENT:   Head: Normocephalic and atraumatic.   Neck: Neck supple.   Normal range of motion.  Pulmonary/Chest: No respiratory distress.   Abdominal: There is no abdominal tenderness.   Gravid abdomen   Musculoskeletal:      Cervical back: Normal range of motion and neck supple.     Neurological: She is alert and oriented to person, place, and time.   Skin: Skin is warm and dry.   Psychiatric: She has a normal mood and affect. Her behavior is normal. Thought content normal.     OB LABOR EXAM:         Vaginal Bleeding: brown and clots.     Dilatation: 1.   Station: -3.   Effacement: 50%.       Comments: Small brown clot cleared with one proctoswab. No active bleeding from cervical os.        ED Course   Obtain Fetal nonstress test (NST)    Date/Time: 2025 7:31 AM    Performed by: Abby Burr MD  Authorized by: Sadaf Antony MD    Nonstress Test:     Variability:  6-25 BPM    Decelerations:  None    Accelerations:  15 bpm    Baseline:  125    Uterine Irritability: No      Contractions:  Irregular    Contraction Frequency:  8-10  Biophysical Profile:     Nonstress Test Interpretation: reactive      Overall Impression:  Reassuring  Post-procedure:     Patient tolerance:  Patient tolerated the procedure well with no immediate complications    Labs Reviewed   POCT URINALYSIS W/O SCOPE   POCT GLUCOSE MONITORING CONTINUOUS          Imaging Results    None          Medications - No data to display  Medical Decision Making  Susan Corona is a 29 y.o. F at 35w3d presents complaining of vaginal bleeding.     Temp:  [97.6 °F (36.4 °C)] 97.6 °F (36.4 °C)  Pulse:  [72-96] 78  Resp:  [18] 18  SpO2:  [96 %-100 %] 99 %  BP: ()/(60-68) 100/60     SVE: /-3   SSE: Small brown clot cleared with one proctoswab. No active bleeding from cervical os.     NST reactive and  reassuring  [place]: q8-10min   BSS: Cephalic SIUP, MVP 3.14    Vaginal bleeding:   -SSE: small brown clot cleared with one proctoswab. No active bleeding.     Bleeding likely 2/2 cervical change. No active bleeding from cervical os. Strict return precautions for increase in vaginal bleeding, decreased fetal movement, leakage of fluid, or painful contractions.   Patient stable for discharge at this time.     Abby Burr MD  Obstetrics & Gynecology, PGY-1                Attending Attestation:   Physician Attestation Statement for Resident:  As the supervising MD   Physician Attestation Statement: I have personally seen and examined this patient.   I agree with the above history.  -:   As the supervising MD I agree with the above PE.     As the supervising MD I agree with the above treatment, course, plan, and disposition.   -: I agree with the above edited resident note. Pt seen and examined, chart and labs reviewed.    Briefly, 28 yo G1 at 35w3d presenting for spotting. Afebrile, VSS. NST Cat I reactive, [place] with irreg ctx that pt is not feeling as such. SSE with small brown clot, removed with 1 proctoswab. No further bleeding noted. SVE 1/50/-3. BSS shows active SIUP in cephalic presentation with MVP 3.14. Pt reassured by normal findings. Discussed kick counts and PTL precautions, pt aware of when to return to the [facility].     All questions answered. Stable for d/c home with outpatient follow up     Sadaf Antony MD  OB Hospitalist  1/6/2025     I was personally present during the critical portions of the procedure(s) performed by the resident and was immediately available in the ED to provide services and assistance as needed during the entire procedure.  I have reviewed and agree with the residents interpretation of the following: lab data.  I have reviewed the following: old records at this facility.                                       Clinical Impression:  Final diagnoses:  [N93.9] Vaginal spotting  (Primary)  [Z3A.35] 35 weeks gestation of pregnancy          ED Disposition Condition    Discharge Stable          ED Prescriptions    None       Follow-up Information    None          Sadaf Antony MD  01/06/25 0942

## 2025-01-06 NOTE — DISCHARGE INSTRUCTIONS
Keep previously scheduled Clinic appointment.     Return or call if you have any of the following symptoms: blurred vision, headache, vaginal bleeding, nausea/ vomiting, leaking of fluid, contractions that are 4-5 in one hour (before 36 weeks), decreased fetal movements ( 10 kicks in 2 hours), headache not relieved by Tylenol, or temp of 100.4 and greater.  Begin doing fetal kick counts, (at least 10 movements in 2 hours) starting at 28 weeks gestation. Term Labor: We want your contractions 5 min apart for 2 hours.    Any questions or concerns call Clinic or  desk for assistance.     For any questions or concerns call your Clinic [phone]  or [facility] Labor & Delivery [phone].harge instructions. All questions answered. Patient educated to contact unit with any questions.

## 2025-01-07 ENCOUNTER — PATIENT MESSAGE (OUTPATIENT)
Dept: OBSTETRICS AND GYNECOLOGY | Facility: CLINIC | Age: 30
End: 2025-01-07
Payer: COMMERCIAL

## 2025-01-07 DIAGNOSIS — Z34.90 ENCOUNTER FOR PLANNED INDUCTION OF LABOR: Primary | ICD-10-CM

## 2025-01-13 ENCOUNTER — ROUTINE PRENATAL (OUTPATIENT)
Dept: OBSTETRICS AND GYNECOLOGY | Facility: CLINIC | Age: 30
End: 2025-01-13
Payer: COMMERCIAL

## 2025-01-13 ENCOUNTER — CLINICAL SUPPORT (OUTPATIENT)
Dept: OBSTETRICS AND GYNECOLOGY | Facility: CLINIC | Age: 30
End: 2025-01-13
Payer: COMMERCIAL

## 2025-01-13 VITALS — BODY MASS INDEX: 27.6 KG/M2 | DIASTOLIC BLOOD PRESSURE: 62 MMHG | WEIGHT: 141.31 LBS | SYSTOLIC BLOOD PRESSURE: 102 MMHG

## 2025-01-13 DIAGNOSIS — O24.410 DIET CONTROLLED GESTATIONAL DIABETES MELLITUS (GDM) IN THIRD TRIMESTER: ICD-10-CM

## 2025-01-13 DIAGNOSIS — Z29.11 NEED FOR RSV VACCINATION: Primary | ICD-10-CM

## 2025-01-13 DIAGNOSIS — Z3A.36 36 WEEKS GESTATION OF PREGNANCY: ICD-10-CM

## 2025-01-13 DIAGNOSIS — Z29.11 NEED FOR RSV IMMUNIZATION: ICD-10-CM

## 2025-01-13 DIAGNOSIS — Z34.03 ENCOUNTER FOR SUPERVISION OF NORMAL FIRST PREGNANCY IN THIRD TRIMESTER: Primary | ICD-10-CM

## 2025-01-13 DIAGNOSIS — E03.9 HYPOTHYROIDISM, UNSPECIFIED TYPE: ICD-10-CM

## 2025-01-13 PROCEDURE — 90678 RSV VACC PREF BIVALENT IM: CPT | Mod: S$GLB,,, | Performed by: OBSTETRICS & GYNECOLOGY

## 2025-01-13 PROCEDURE — 90471 IMMUNIZATION ADMIN: CPT | Mod: S$GLB,,, | Performed by: OBSTETRICS & GYNECOLOGY

## 2025-01-13 PROCEDURE — 0502F SUBSEQUENT PRENATAL CARE: CPT | Mod: CPTII,S$GLB,, | Performed by: OBSTETRICS & GYNECOLOGY

## 2025-01-13 PROCEDURE — 99999 PR PBB SHADOW E&M-EST. PATIENT-LVL III: CPT | Mod: PBBFAC,,, | Performed by: OBSTETRICS & GYNECOLOGY

## 2025-01-13 PROCEDURE — 99999 PR PBB SHADOW E&M-EST. PATIENT-LVL I: CPT | Mod: PBBFAC,,,

## 2025-01-13 PROCEDURE — 87081 CULTURE SCREEN ONLY: CPT | Performed by: OBSTETRICS & GYNECOLOGY

## 2025-01-13 NOTE — PROGRESS NOTES
"@36w3d: Doing well, denies CTX, LOF, VB. +FM.   GDM - diet controlled, normal fastings, 6 elevated postprandial <50%, only mildly elevated, otherwise normal. Patient links elevated readings to "cheating" on her diet.   Growth scan done 1/2/25 (@34w) showed EFW 2519g 29% AC 55%.  Desires induction 2/9 @ 4p. Request placed last visit.   RSV today.   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   Last week noticed vaginal spotting, went to the ED, symptoms resolved. No s/sx of vaginitis or labor.  Unable to palpate fetal head due to angle of cervix, but vertex on BSS.   GBS and consents today.  Discuss PP contraception next visit.   RTC 1 week OB f/u. PTL/PPROM/PreE/FM precautions reviewed.       "

## 2025-01-13 NOTE — PROGRESS NOTES
Patient here for abrysvo injection. No pain noted, injection given. Patient tolerated well advised to wait in lobby 5 minutes and report any adverse reactions.       Site: ld

## 2025-01-16 ENCOUNTER — PATIENT MESSAGE (OUTPATIENT)
Dept: OBSTETRICS AND GYNECOLOGY | Facility: CLINIC | Age: 30
End: 2025-01-16
Payer: COMMERCIAL

## 2025-01-16 LAB — BACTERIA SPEC AEROBE CULT: NORMAL

## 2025-01-24 ENCOUNTER — ROUTINE PRENATAL (OUTPATIENT)
Dept: OBSTETRICS AND GYNECOLOGY | Facility: CLINIC | Age: 30
End: 2025-01-24
Payer: COMMERCIAL

## 2025-01-24 VITALS
SYSTOLIC BLOOD PRESSURE: 102 MMHG | BODY MASS INDEX: 27.99 KG/M2 | WEIGHT: 143.31 LBS | DIASTOLIC BLOOD PRESSURE: 64 MMHG

## 2025-01-24 DIAGNOSIS — E03.9 HYPOTHYROIDISM, UNSPECIFIED TYPE: ICD-10-CM

## 2025-01-24 DIAGNOSIS — Z34.03 ENCOUNTER FOR SUPERVISION OF NORMAL FIRST PREGNANCY IN THIRD TRIMESTER: Primary | ICD-10-CM

## 2025-01-24 DIAGNOSIS — Z3A.38 38 WEEKS GESTATION OF PREGNANCY: ICD-10-CM

## 2025-01-24 DIAGNOSIS — O24.410 DIET CONTROLLED GESTATIONAL DIABETES MELLITUS (GDM) IN THIRD TRIMESTER: ICD-10-CM

## 2025-01-24 PROCEDURE — 0502F SUBSEQUENT PRENATAL CARE: CPT | Mod: CPTII,S$GLB,, | Performed by: OBSTETRICS & GYNECOLOGY

## 2025-01-24 PROCEDURE — 99999 PR PBB SHADOW E&M-EST. PATIENT-LVL III: CPT | Mod: PBBFAC,,, | Performed by: OBSTETRICS & GYNECOLOGY

## 2025-01-24 NOTE — PROGRESS NOTES
"@38w0d: Doing well, denies CTX, LOF, VB. +FM. Increased back pain, some knee pain when going from lying down to standing at nighttime.   IOL scheduled 2/9 @ 4p.  Tdap/flu/RSV done.   Consents signed, GBS negative.  Pump Rx done.  Peds - [facility].   PP contraception - considering condoms. May consider LARC. Will continue to discuss.   GDM - diet controlled, normal fastings, 5 elevated postprandial <50%, only mildly elevated, otherwise normal. Patient links elevated readings to "cheating" on her diet. PP breakfast all normal, okay to stop checking post-breakfast, but continue to check fasting, post-lunch and dinner.  Growth scan done 1/2/25 (@34w) showed EFW 2519g 29% AC 55%.  Scant bloody show with cervical check today.   RTC 1 week OB f/u. Labor/PROM/PreE/FM precautions reviewed.       "

## 2025-01-25 ENCOUNTER — HOSPITAL ENCOUNTER (INPATIENT)
Facility: OTHER | Age: 30
LOS: 3 days | Discharge: HOME | End: 2025-01-28
Attending: OBSTETRICS & GYNECOLOGY | Admitting: OBSTETRICS & GYNECOLOGY
Payer: COMMERCIAL

## 2025-01-25 ENCOUNTER — ANESTHESIA (OUTPATIENT)
Dept: OBSTETRICS AND GYNECOLOGY | Facility: OTHER | Age: 30
End: 2025-01-25
Payer: COMMERCIAL

## 2025-01-25 ENCOUNTER — TELEPHONE (OUTPATIENT)
Dept: OBSTETRICS AND GYNECOLOGY | Facility: OTHER | Age: 30
End: 2025-01-25
Payer: COMMERCIAL

## 2025-01-25 ENCOUNTER — ANESTHESIA EVENT (OUTPATIENT)
Dept: OBSTETRICS AND GYNECOLOGY | Facility: OTHER | Age: 30
End: 2025-01-25
Payer: COMMERCIAL

## 2025-01-25 DIAGNOSIS — Z3A.38 38 WEEKS GESTATION OF PREGNANCY: ICD-10-CM

## 2025-01-25 DIAGNOSIS — O24.410 DIET CONTROLLED GESTATIONAL DIABETES MELLITUS (GDM) IN THIRD TRIMESTER: ICD-10-CM

## 2025-01-25 DIAGNOSIS — O42.90 PROM (PREMATURE RUPTURE OF MEMBRANES): ICD-10-CM

## 2025-01-25 DIAGNOSIS — O42.90 PREMATURE RUPTURE OF MEMBRANES, UNSPECIFIED DURATION TO ONSET OF LABOR, UNSPECIFIED GESTATIONAL AGE: ICD-10-CM

## 2025-01-25 DIAGNOSIS — R00.0 TACHYCARDIA: ICD-10-CM

## 2025-01-25 PROBLEM — E07.9 THYROID DISEASE AFFECTING PREGNANCY: Status: ACTIVE | Noted: 2025-01-25

## 2025-01-25 PROBLEM — D69.6 BENIGN GESTATIONAL THROMBOCYTOPENIA IN THIRD TRIMESTER: Status: ACTIVE | Noted: 2025-01-25

## 2025-01-25 PROBLEM — N91.2 AMENORRHEA: Status: RESOLVED | Noted: 2024-07-12 | Resolved: 2025-01-25

## 2025-01-25 PROBLEM — O99.113 BENIGN GESTATIONAL THROMBOCYTOPENIA IN THIRD TRIMESTER: Status: ACTIVE | Noted: 2025-01-25

## 2025-01-25 PROBLEM — O99.280 THYROID DISEASE AFFECTING PREGNANCY: Status: ACTIVE | Noted: 2025-01-25

## 2025-01-25 LAB
ABO + RH BLD: NORMAL
BASOPHILS # BLD AUTO: 0.04 K/UL (ref 0–0.2)
BASOPHILS NFR BLD: 0.5 % (ref 0–1.9)
BLD GP AB SCN CELLS X3 SERPL QL: NORMAL
DIFFERENTIAL METHOD BLD: ABNORMAL
EOSINOPHIL # BLD AUTO: 0.1 K/UL (ref 0–0.5)
EOSINOPHIL NFR BLD: 0.9 % (ref 0–8)
ERYTHROCYTE [DISTWIDTH] IN BLOOD BY AUTOMATED COUNT: 13.9 % (ref 11.5–14.5)
HCT VFR BLD AUTO: 40.6 % (ref 37–48.5)
HGB BLD-MCNC: 14.4 G/DL (ref 12–16)
IMM GRANULOCYTES # BLD AUTO: 0.11 K/UL (ref 0–0.04)
IMM GRANULOCYTES NFR BLD AUTO: 1.4 % (ref 0–0.5)
LYMPHOCYTES # BLD AUTO: 1.8 K/UL (ref 1–4.8)
LYMPHOCYTES NFR BLD: 22.1 % (ref 18–48)
MCH RBC QN AUTO: 31.2 PG (ref 27–31)
MCHC RBC AUTO-ENTMCNC: 35.5 G/DL (ref 32–36)
MCV RBC AUTO: 88 FL (ref 82–98)
MONOCYTES # BLD AUTO: 0.6 K/UL (ref 0.3–1)
MONOCYTES NFR BLD: 7.4 % (ref 4–15)
NEUTROPHILS # BLD AUTO: 5.5 K/UL (ref 1.8–7.7)
NEUTROPHILS NFR BLD: 67.7 % (ref 38–73)
NRBC BLD-RTO: 1 /100 WBC
PLATELET # BLD AUTO: 140 K/UL (ref 150–450)
PMV BLD AUTO: 11.3 FL (ref 9.2–12.9)
POCT GLUCOSE: 62 MG/DL (ref 70–110)
POCT GLUCOSE: 63 MG/DL (ref 70–110)
RBC # BLD AUTO: 4.61 M/UL (ref 4–5.4)
SPECIMEN OUTDATE: NORMAL
TREPONEMA PALLIDUM IGG+IGM AB [PRESENCE] IN SERUM OR PLASMA BY IMMUNOASSAY: NONREACTIVE
WBC # BLD AUTO: 8.13 K/UL (ref 3.9–12.7)

## 2025-01-25 PROCEDURE — 99285 EMERGENCY DEPT VISIT HI MDM: CPT | Mod: 25,,, | Performed by: OBSTETRICS & GYNECOLOGY

## 2025-01-25 PROCEDURE — 99285 EMERGENCY DEPT VISIT HI MDM: CPT | Mod: 25

## 2025-01-25 PROCEDURE — 86900 BLOOD TYPING SEROLOGIC ABO: CPT | Performed by: OBSTETRICS & GYNECOLOGY

## 2025-01-25 PROCEDURE — 59025 FETAL NON-STRESS TEST: CPT

## 2025-01-25 PROCEDURE — 63600175 PHARM REV CODE 636 W HCPCS: Performed by: OBSTETRICS & GYNECOLOGY

## 2025-01-25 PROCEDURE — 85025 COMPLETE CBC W/AUTO DIFF WBC: CPT | Performed by: OBSTETRICS & GYNECOLOGY

## 2025-01-25 PROCEDURE — 59025 FETAL NON-STRESS TEST: CPT | Mod: 26,59,, | Performed by: OBSTETRICS & GYNECOLOGY

## 2025-01-25 PROCEDURE — 11000001 HC ACUTE MED/SURG PRIVATE ROOM

## 2025-01-25 PROCEDURE — 76815 OB US LIMITED FETUS(S): CPT | Mod: 26,,, | Performed by: OBSTETRICS & GYNECOLOGY

## 2025-01-25 PROCEDURE — 86593 SYPHILIS TEST NON-TREP QUANT: CPT | Performed by: OBSTETRICS & GYNECOLOGY

## 2025-01-25 PROCEDURE — 25000003 PHARM REV CODE 250: Performed by: OBSTETRICS & GYNECOLOGY

## 2025-01-25 RX ORDER — OXYTOCIN-SODIUM CHLORIDE 0.9% IV SOLN 30 UNIT/500ML 30-0.9/5 UT/ML-%
0-32 SOLUTION INTRAVENOUS CONTINUOUS
Status: DISCONTINUED | OUTPATIENT
Start: 2025-01-25 | End: 2025-01-26

## 2025-01-25 RX ORDER — LEVOTHYROXINE SODIUM 25 UG/1
25 TABLET ORAL
Status: DISCONTINUED | OUTPATIENT
Start: 2025-01-26 | End: 2025-01-28 | Stop reason: HOSPADM

## 2025-01-25 RX ORDER — SIMETHICONE 80 MG
1 TABLET,CHEWABLE ORAL 4 TIMES DAILY PRN
Status: DISCONTINUED | OUTPATIENT
Start: 2025-01-25 | End: 2025-01-26

## 2025-01-25 RX ORDER — CALCIUM CARBONATE 200(500)MG
500 TABLET,CHEWABLE ORAL 3 TIMES DAILY PRN
Status: DISCONTINUED | OUTPATIENT
Start: 2025-01-25 | End: 2025-01-26

## 2025-01-25 RX ORDER — LIDOCAINE HYDROCHLORIDE 10 MG/ML
10 INJECTION, SOLUTION INFILTRATION; PERINEURAL ONCE AS NEEDED
Status: DISCONTINUED | OUTPATIENT
Start: 2025-01-25 | End: 2025-01-26

## 2025-01-25 RX ORDER — ONDANSETRON 8 MG/1
8 TABLET, ORALLY DISINTEGRATING ORAL EVERY 8 HOURS PRN
Status: DISCONTINUED | OUTPATIENT
Start: 2025-01-25 | End: 2025-01-26

## 2025-01-25 RX ORDER — OXYTOCIN-SODIUM CHLORIDE 0.9% IV SOLN 30 UNIT/500ML 30-0.9/5 UT/ML-%
10 SOLUTION INTRAVENOUS ONCE AS NEEDED
Status: COMPLETED | OUTPATIENT
Start: 2025-01-25 | End: 2025-01-26

## 2025-01-25 RX ORDER — SODIUM CHLORIDE, SODIUM LACTATE, POTASSIUM CHLORIDE, CALCIUM CHLORIDE 600; 310; 30; 20 MG/100ML; MG/100ML; MG/100ML; MG/100ML
INJECTION, SOLUTION INTRAVENOUS CONTINUOUS
Status: DISCONTINUED | OUTPATIENT
Start: 2025-01-25 | End: 2025-01-26

## 2025-01-25 RX ORDER — SODIUM CHLORIDE 9 MG/ML
INJECTION, SOLUTION INTRAVENOUS
Status: DISCONTINUED | OUTPATIENT
Start: 2025-01-25 | End: 2025-01-26

## 2025-01-25 RX ADMIN — SODIUM CHLORIDE, POTASSIUM CHLORIDE, SODIUM LACTATE AND CALCIUM CHLORIDE 500 ML: 600; 310; 30; 20 INJECTION, SOLUTION INTRAVENOUS at 11:01

## 2025-01-25 RX ADMIN — OXYTOCIN 4 MILLI-UNITS/MIN: 10 INJECTION INTRAVENOUS at 01:01

## 2025-01-25 NOTE — Clinical Note
I certify that Inpatient services for greater than or equal to 2 midnights are medically necessary:: Yes   Transfer To (Destination): [facility] LABOR AND DELIVERY [[id number]]   Diagnosis: PROM (premature rupture of membranes) [721941]   Future Attending Provider: [address]ACHO [address]RE[address] [[id number]]   Reason for IP Medical Treatment  (Clinical interventions that can only be accomplished in the IP setting? ) :: Labor and delivery   Estimated Length of Stay:: 2 midnights

## 2025-01-25 NOTE — ED PROVIDER NOTES
"Encounter Date: 1/25/2025       History     Chief Complaint   Patient presents with    Rupture of Membranes     30yo G1 at 38w1d presenting with leakage of fluid. Reports leaking fluid since 8am this morning. Initially "sticky and yellow," now clear fluid. Denies contractions or vaginal bleeding. Reports good fetal movement. No other concerns at this time. Prenatal care with Dr. Bedolla. Pregnancy complicated by A1 gDM and Hypothyroidism on Synthroid 25mcg.       Review of patient's allergies indicates:   Allergen Reactions    Minoxidil Rash     No past medical history on file.  No past surgical history on file.  Family History   Problem Relation Name Age of Onset    Breast cancer Neg Hx      Colon cancer Neg Hx      Ovarian cancer Neg Hx       Social History     Tobacco Use    Smoking status: Never     Passive exposure: Never    Smokeless tobacco: Never   Substance Use Topics    Alcohol use: Not Currently    Drug use: Never     Review of Systems   Constitutional:  Negative for chills and fever.   Respiratory:  Negative for shortness of breath.    Cardiovascular:  Negative for chest pain.   Gastrointestinal:  Negative for abdominal pain.   Genitourinary:  Negative for pelvic pain and vaginal bleeding.   Neurological:  Negative for dizziness, light-headedness and headaches.       Physical Exam     Initial Vitals [01/25/25 1109]   BP Pulse Resp Temp SpO2   124/85 94 17 98 °F (36.7 °C) --      MAP       --         Physical Exam    Vitals reviewed.  Constitutional: She appears well-developed and well-nourished.   HENT:   Head: Normocephalic.   Cardiovascular:  Normal rate.           Pulmonary/Chest: No respiratory distress.   Abdominal: Abdomen is soft. There is no abdominal tenderness.   Musculoskeletal:         General: Normal range of motion.     Neurological: She is alert.   Skin: Skin is warm and dry.   Psychiatric: She has a normal mood and affect.     OB LABOR EXAM:                       Comments: Grossly " ruptured, +pooling of clear fluid, +nitrazine, +ferning. Cvx 1.5/50/-3. Bloody show noted.       ED Course   Fetal non-stress test    Date/Time: 1/25/2025 11:50 AM    Performed by: Patricia Brink MD  Authorized by: Margy Bedolla MD    Nonstress Test:     Variability:  6-25 BPM    Decelerations:  Late    Accelerations:  15 bpm    Contractions:  Irregular  Biophysical Profile:     Nonstress Test Interpretation: reactive      Overall Impression:  Reassuring  Post-procedure:     Patient tolerance:  Patient tolerated the procedure well with no immediate complications    Labs Reviewed   CBC W/ AUTO DIFFERENTIAL   TREPONEMA PALLIDIUM ANTIBODIES IGG, IGM   TYPE & SCREEN          Imaging Results    None          Medications   lactated ringers bolus 1,000 mL (has no administration in time range)   lactated ringers bolus 500 mL (has no administration in time range)   lactated ringers infusion (has no administration in time range)   0.9% NaCl infusion (has no administration in time range)   ondansetron disintegrating tablet 8 mg (has no administration in time range)   calcium carbonate 200 mg calcium (500 mg) chewable tablet 500 mg (has no administration in time range)   simethicone chewable tablet 80 mg (has no administration in time range)   levothyroxine tablet 25 mcg (has no administration in time range)     Medical Decision Making  30yo G1 at 38w1d with PROM.    NST with 1 small late deceleration, otherwise overall reactive and reassuring.  Tocometry with irregular contractions.  US: cephalic presentation.    Counseled regarding risks/benefits of Pitocin IOL vs. Expectant management of PROM. Admit to L&D for IOL.     Amount and/or Complexity of Data Reviewed  Labs: ordered.    Risk  OTC drugs.  Prescription drug management.                                      Clinical Impression:  Final diagnoses:  [O42.90] Premature rupture of membranes, unspecified duration to onset of labor, unspecified gestational age  (Primary)  [O42.90] PROM (premature rupture of membranes)  [Z3A.38] 38 weeks gestation of pregnancy          ED Disposition Condition    Send to L&D Patricia Alba MD  01/25/25 0312

## 2025-01-25 NOTE — PROGRESS NOTES
LABOR NOTE    S:  Complaints: No.  Epidural working:  not applicable      O: /64   Pulse 85   Temp 97.6 °F (36.4 °C) (Oral)   Resp 18   LMP 2024 (Exact Date)   SpO2 99%   Breastfeeding No     FHT: 130 bpm, mod genesis, + accel, no decel Cat 1 (reassuring)  CTX: q 2-3 minutes, pit @ 12 milliunits/min  SVE: 3/50/-3    Glucose: 62    ASSESSMENT:   30 yo  at 38w1d with PROM, GDMA1, and GTP    PLAN:  Continue Close Maternal/Fetal Monitoring  Pitocin Augmentation per protocol  Recheck 2 hours or PRN  Accucheck within range. Continue accucheck q4/q2.         Alea Hansen MD  [facility]  2025

## 2025-01-25 NOTE — H&P
"   HISTORY AND PHYSICAL                                                OBSTETRICS          Subjective:       Susan Corona is a 30yo G1 at 38w1d presenting with leakage of fluid. Reports leaking fluid since 8am this morning. Initially "sticky and yellow," now clear fluid. Denies contractions or vaginal bleeding. Reports good fetal movement. No other concerns at this time. Prenatal care with Dr. Bedolla. Pregnancy complicated by A1 gDM and Hypothyroidism on Synthroid 25mcg.     Review of Systems negative except as above.    PMHx: No past medical history on file.    PSHx: No past surgical history on file.    All:   Review of patient's allergies indicates:   Allergen Reactions    Minoxidil Rash       Meds:   Medications Prior to Admission   Medication Sig Dispense Refill Last Dose/Taking    levothyroxine (SYNTHROID) 25 MCG tablet Take 1 tablet (25 mcg total) by mouth before breakfast. 90 tablet 3 1/25/2025    blood sugar diagnostic (CONTOUR TEST STRIPS) Strp Check blood sugar four times daily (fasting and 2 hours after breakfast, lunch, and dinner) 100 strip 11     blood-glucose meter kit Check blood sugar four times daily (fasting and 2 hours after breakfast, lunch, and dinner) 1 each 0     cycloSPORINE (RESTASIS) 0.05 % ophthalmic emulsion Place 1 drop into both eyes 2 (two) times daily. (Patient not taking: Reported on 1/24/2025) 180 each 3     doxylamine succinate (UNISOM, DOXYLAMINE,) 25 mg tablet Take 1 tablet (25 mg total) by mouth every evening. (Patient not taking: Reported on 1/24/2025) 60 tablet 2     ferrous sulfate 325 (65 FE) MG EC tablet Take 1 tablet (325 mg total) by mouth once daily. 90 tablet 3     lancets (LANCETS,THIN) 28 gauge Misc Check blood sugar four times daily (fasting and 2 hours after breakfast, lunch, and dinner) 100 each 11     PNV,calcium 72-iron-folic acid (PRENATAL VITAMIN PLUS LOW IRON) 27 mg iron- 1 mg Tab Take 1 tablet (1 each total) by mouth once daily. 30 tablet 11     " pyridoxine, vitamin B6, (B-6) 25 MG Tab Take 1 tablet (25 mg total) by mouth every evening. (Patient not taking: Reported on 1/3/2025) 60 tablet 2        SH:   Social History     Socioeconomic History    Marital status:    Tobacco Use    Smoking status: Never     Passive exposure: Never    Smokeless tobacco: Never   Substance and Sexual Activity    Alcohol use: Not Currently    Drug use: Never    Sexual activity: Not Currently     Partners: Male     Social Drivers of Health     Financial Resource Strain: Low Risk  (2024)    Overall Financial Resource Strain (CARDIA)     Difficulty of Paying Living Expenses: Not very hard   Food Insecurity: No Food Insecurity (2024)    Hunger Vital Sign     Worried About Running Out of Food in the Last Year: Never true     Ran Out of Food in the Last Year: Never true   Physical Activity: Unknown (2024)    Exercise Vital Sign     Days of Exercise per Week: 4 days   Stress: Stress Concern Present (2024)    [place] citizen [place] [employer] - Occupational Stress Questionnaire     Feeling of Stress : Rather much   Housing Stability: Unknown (2024)    Housing Stability Vital Sign     Unable to Pay for Housing in the Last Year: No       FH:   Family History   Problem Relation Name Age of Onset    Breast cancer Neg Hx      Colon cancer Neg Hx      Ovarian cancer Neg Hx         OBHx:   OB History    Para Term  AB Living   1 0 0 0 0 0   SAB IAB Ectopic Multiple Live Births   0 0 0 0 0      # Outcome Date GA Lbr Harshad/2nd Weight Sex Type Anes PTL Lv   1 Current                Objective:       /85   Pulse 94   Temp 98 °F (36.7 °C) (Oral)   Resp 17   LMP 2024 (Exact Date)   Breastfeeding No     Vitals:    25 1109   BP: 124/85   Pulse: 94   Resp: 17   Temp: 98 °F (36.7 °C)   TempSrc: Oral       General:   alert, appears stated age and cooperative, no apparent distress   HENT:  normocephalic, atraumatic   Eyes:  extraocular  movements and conjunctivae normal   Neck:  supple, range of motion normal, no thyromegaly   Lungs:   no respiratory distress   Heart:   regular rate   Abdomen:  soft, non-tender, non-distended but gravid, no rebound or guarding   Extremities negative edema, negative erythema   FHT: 150, moderate BTBV, +accels, 1 late decel;  Cat 2 (reassuring)                 TOCO: Irregular   Presentations: cephalic by ultrasound   Cervix:     Dilation: 1cm    Effacement: 50%    Station:  -3    Consistency: soft    Position: posterior   Sterile Speculum Exam: Grossly ruptured, +pooling of clear fluid, +nitrazine, +ferning.     Recent Growth Scan: 1/2/25 34w6d 2519g 29th %ile    Lab Review  Blood Type A POS  GBBS: negative  Rubella: Immune  RPR: Negative  HIV: negative  HepB: negative       Assessment:     38w1d weeks gestation with PROM    Active Hospital Problems    Diagnosis  POA    Thyroid disease affecting pregnancy [O99.280, E07.9]  Yes    Benign gestational thrombocytopenia in third trimester [O99.113, D69.6]  Yes    Diet controlled gestational diabetes mellitus (GDM) in third trimester [O24.410]  Yes    Anemia during pregnancy in second trimester [O99.012]  Yes    Encounter for supervision of normal first pregnancy in third trimester [Z34.03]  Not Applicable      Resolved Hospital Problems   No resolved problems to display.          Plan:      Risks, benefits, alternatives and possible complications of IOL for PROM have been discussed in detail with the patient.   - Consents reviewed in chart  - Admit to Labor and Delivery unit  - Epidural per Anesthesia  - Draw CBC, T&S  - Notify Staff    Post-Partum Hemorrhage risk - low

## 2025-01-25 NOTE — ANESTHESIA PREPROCEDURE EVALUATION
[facility]  Anesthesia Pre-Operative Evaluation         Patient Name: Susan Corona  YOB: 1995  MRN: [id number]    2025      Susan Corona is a 29 y.o. female  at 38w1d who presented with leakage of fluid. Reports leaking fluid since 8am this morning.    Pregnancy complicated by gestational DM and hypothyroidism.    She denies previous neuraxial anesthesia.   She denies personal or family history of complications with anesthesia.    She denies history of HTN, asthma, bleeding or coagulation disorders, spine abnormalities, or previous back surgeries.      OB History    Para Term  AB Living   1             SAB IAB Ectopic Multiple Live Births                  # Outcome Date GA Lbr Harshad/2nd Weight Sex Type Anes PTL Lv   1 Current                Review of patient's allergies indicates:   Allergen Reactions    Minoxidil Rash       Wt Readings from Last 1 Encounters:   25 0859 65 kg (143 lb 4.8 oz)       BP Readings from Last 3 Encounters:   25 (!) 103/59   25 102/64   25 102/62       Patient Active Problem List   Diagnosis    Initial obstetric visit, first trimester    Hypothyroidism    Encounter for supervision of normal first pregnancy in third trimester    Abnormal glucose tolerance in pregnancy    Anemia during pregnancy in second trimester    Diet controlled gestational diabetes mellitus (GDM) in third trimester    Thyroid disease affecting pregnancy    Benign gestational thrombocytopenia in third trimester       No past surgical history on file.    Tobacco Use: Low Risk  (2025)    Patient History     Smoking Tobacco Use: Never     Smokeless Tobacco Use: Never     Passive Exposure: Never     Alcohol Use: Not At Risk (2024)    AUDIT-C     Frequency of Alcohol Consumption: Monthly or less     Average Number of Drinks: 1 or 2     Frequency of Binge Drinking: Never     Social History     Substance and Sexual Activity   Drug Use  "Never         Chemistry        Component Value Date/Time     05/16/2024 1549    K 4.0 05/16/2024 1549     05/16/2024 1549    CO2 22 (L) 05/16/2024 1549    BUN 10 05/16/2024 1549    CREATININE 0.7 05/16/2024 1549    GLU 68 (L) 05/16/2024 1549        Component Value Date/Time    CALCIUM 9.3 05/16/2024 1549    ALKPHOS 31 (L) 05/16/2024 1549    AST 19 05/16/2024 1549    ALT 21 05/16/2024 1549    BILITOT 0.6 05/16/2024 1549            Lab Results   Component Value Date    WBC 8.13 01/25/2025    HGB 14.4 01/25/2025    HCT 40.6 01/25/2025     (L) 01/25/2025       No results found for: "LABPROT", "INR", "APTT"        Pre-op Assessment    I have reviewed the Patient Summary Reports.     I have reviewed the Nursing Notes. I have reviewed the NPO Status.   I have reviewed the Medications.     Review of Systems  Anesthesia Hx:  No problems with previous Anesthesia             Denies Family Hx of Anesthesia complications.    Denies Personal Hx of Anesthesia complications.                    Hematology/Oncology:       -- Anemia:                                  Cardiovascular:        Denies MI.  Denies CAD.       Denies Angina.  Denies CHF.                                   Pulmonary:    Denies COPD.  Denies Asthma.   Denies Shortness of breath.                  Hepatic/GI:      Denies GERD.                OB/GYN/PEDS:  GDM  Gestational thrombocytopenia           Neurological:    Denies CVA.    Denies Seizures.                                Endocrine:   Hypothyroidism              Physical Exam  General: Oriented, Alert, Cooperative and Well nourished    Airway:  Mallampati: III   Mouth Opening: Normal  TM Distance: Normal  Neck ROM: Normal ROM    Dental:  Intact    Chest/Lungs:  Normal Respiratory Rate    Heart:  Rate: Normal        Anesthesia Plan  Type of Anesthesia, risks & benefits discussed:    Anesthesia Type: Gen ETT, CSE, Spinal, Epidural  Intra-op Monitoring Plan: Standard ASA Monitors  Post Op Pain " Control Plan: multimodal analgesia, IV/PO Opioids PRN, intrathecal opioid and epidural analgesia  Induction:  IV and rapid sequence  Airway Plan: Video, Post-Induction  Informed Consent: Informed consent signed with the Patient and all parties understand the risks and agree with anesthesia plan.  All questions answered.   ASA Score: 2  Day of Surgery Review of History & Physical: H&P Update referred to the surgeon/provider.    Ready For Surgery From Anesthesia Perspective.     .

## 2025-01-26 PROBLEM — Z34.03 ENCOUNTER FOR SUPERVISION OF NORMAL FIRST PREGNANCY IN THIRD TRIMESTER: Status: RESOLVED | Noted: 2024-10-04 | Resolved: 2025-01-26

## 2025-01-26 LAB
OHS QRS DURATION: 44 MS
OHS QTC CALCULATION: 433 MS
POCT GLUCOSE: 71 MG/DL (ref 70–110)
POCT GLUCOSE: 84 MG/DL (ref 70–110)

## 2025-01-26 PROCEDURE — 51702 INSERT TEMP BLADDER CATH: CPT

## 2025-01-26 PROCEDURE — 59409 OBSTETRICAL CARE: CPT | Mod: ,,, | Performed by: ANESTHESIOLOGY

## 2025-01-26 PROCEDURE — 93010 ELECTROCARDIOGRAM REPORT: CPT | Mod: ,,, | Performed by: INTERNAL MEDICINE

## 2025-01-26 PROCEDURE — 62326 NJX INTERLAMINAR LMBR/SAC: CPT

## 2025-01-26 PROCEDURE — 63600175 PHARM REV CODE 636 W HCPCS: Performed by: OBSTETRICS & GYNECOLOGY

## 2025-01-26 PROCEDURE — 63600175 PHARM REV CODE 636 W HCPCS

## 2025-01-26 PROCEDURE — 4A1HXCZ MONITORING OF PRODUCTS OF CONCEPTION, CARDIAC RATE, EXTERNAL APPROACH: ICD-10-PCS | Performed by: OBSTETRICS & GYNECOLOGY

## 2025-01-26 PROCEDURE — 11000001 HC ACUTE MED/SURG PRIVATE ROOM

## 2025-01-26 PROCEDURE — 72200005 HC VAGINAL DELIVERY LEVEL II

## 2025-01-26 PROCEDURE — 27200710 HC EPIDURAL INFUSION PUMP SET: Performed by: ANESTHESIOLOGY

## 2025-01-26 PROCEDURE — 0KQM0ZZ REPAIR PERINEUM MUSCLE, OPEN APPROACH: ICD-10-PCS | Performed by: OBSTETRICS & GYNECOLOGY

## 2025-01-26 PROCEDURE — 72100002 HC LABOR CARE, 1ST 8 HOURS

## 2025-01-26 PROCEDURE — 72100003 HC LABOR CARE, EA. ADDL. 8 HRS

## 2025-01-26 PROCEDURE — C1751 CATH, INF, PER/CENT/MIDLINE: HCPCS | Performed by: ANESTHESIOLOGY

## 2025-01-26 PROCEDURE — 93005 ELECTROCARDIOGRAM TRACING: CPT

## 2025-01-26 PROCEDURE — 25000003 PHARM REV CODE 250

## 2025-01-26 PROCEDURE — 59400 OBSTETRICAL CARE: CPT | Mod: AT,,, | Performed by: OBSTETRICS & GYNECOLOGY

## 2025-01-26 PROCEDURE — 25000003 PHARM REV CODE 250: Performed by: OBSTETRICS & GYNECOLOGY

## 2025-01-26 RX ORDER — FAMOTIDINE 10 MG/ML
20 INJECTION INTRAVENOUS ONCE
Status: CANCELLED | OUTPATIENT
Start: 2025-01-26 | End: 2025-01-26

## 2025-01-26 RX ORDER — OXYTOCIN-SODIUM CHLORIDE 0.9% IV SOLN 30 UNIT/500ML 30-0.9/5 UT/ML-%
95 SOLUTION INTRAVENOUS ONCE AS NEEDED
Status: DISCONTINUED | OUTPATIENT
Start: 2025-01-26 | End: 2025-01-26

## 2025-01-26 RX ORDER — ACETAMINOPHEN 325 MG/1
650 TABLET ORAL EVERY 6 HOURS PRN
Status: DISCONTINUED | OUTPATIENT
Start: 2025-01-26 | End: 2025-01-28 | Stop reason: HOSPADM

## 2025-01-26 RX ORDER — IBUPROFEN 600 MG/1
600 TABLET ORAL EVERY 6 HOURS
Status: DISCONTINUED | OUTPATIENT
Start: 2025-01-26 | End: 2025-01-28 | Stop reason: HOSPADM

## 2025-01-26 RX ORDER — DIPHENOXYLATE HYDROCHLORIDE AND ATROPINE SULFATE 2.5; .025 MG/1; MG/1
2 TABLET ORAL EVERY 6 HOURS PRN
Status: DISCONTINUED | OUTPATIENT
Start: 2025-01-26 | End: 2025-01-28 | Stop reason: HOSPADM

## 2025-01-26 RX ORDER — OXYTOCIN-SODIUM CHLORIDE 0.9% IV SOLN 30 UNIT/500ML 30-0.9/5 UT/ML-%
95 SOLUTION INTRAVENOUS CONTINUOUS PRN
Status: DISCONTINUED | OUTPATIENT
Start: 2025-01-26 | End: 2025-01-26

## 2025-01-26 RX ORDER — MISOPROSTOL 200 UG/1
TABLET ORAL
Status: DISCONTINUED
Start: 2025-01-26 | End: 2025-01-26 | Stop reason: WASHOUT

## 2025-01-26 RX ORDER — HYDROCORTISONE 25 MG/G
CREAM TOPICAL 3 TIMES DAILY PRN
Status: DISCONTINUED | OUTPATIENT
Start: 2025-01-26 | End: 2025-01-28 | Stop reason: HOSPADM

## 2025-01-26 RX ORDER — METHYLERGONOVINE MALEATE 0.2 MG/ML
INJECTION INTRAVENOUS
Status: DISCONTINUED
Start: 2025-01-26 | End: 2025-01-26 | Stop reason: WASHOUT

## 2025-01-26 RX ORDER — OXYTOCIN-SODIUM CHLORIDE 0.9% IV SOLN 30 UNIT/500ML 30-0.9/5 UT/ML-%
95 SOLUTION INTRAVENOUS CONTINUOUS PRN
Status: DISCONTINUED | OUTPATIENT
Start: 2025-01-26 | End: 2025-01-28 | Stop reason: HOSPADM

## 2025-01-26 RX ORDER — FENTANYL/BUPIVACAINE/NS/PF 2MCG/ML-.1
PLASTIC BAG, INJECTION (ML) INJECTION
Status: DISCONTINUED | OUTPATIENT
Start: 2025-01-26 | End: 2025-01-26

## 2025-01-26 RX ORDER — ONDANSETRON 8 MG/1
8 TABLET, ORALLY DISINTEGRATING ORAL EVERY 8 HOURS PRN
Status: DISCONTINUED | OUTPATIENT
Start: 2025-01-26 | End: 2025-01-28 | Stop reason: HOSPADM

## 2025-01-26 RX ORDER — HYDROCODONE BITARTRATE AND ACETAMINOPHEN 5; 325 MG/1; MG/1
1 TABLET ORAL EVERY 4 HOURS PRN
Status: DISCONTINUED | OUTPATIENT
Start: 2025-01-26 | End: 2025-01-28 | Stop reason: HOSPADM

## 2025-01-26 RX ORDER — METHYLERGONOVINE MALEATE 0.2 MG/ML
200 INJECTION INTRAVENOUS ONCE AS NEEDED
Status: DISCONTINUED | OUTPATIENT
Start: 2025-01-26 | End: 2025-01-28 | Stop reason: HOSPADM

## 2025-01-26 RX ORDER — PRENATAL WITH FERROUS FUM AND FOLIC ACID 3080; 920; 120; 400; 22; 1.84; 3; 20; 10; 1; 12; 200; 27; 25; 2 [IU]/1; [IU]/1; MG/1; [IU]/1; MG/1; MG/1; MG/1; MG/1; MG/1; MG/1; UG/1; MG/1; MG/1; MG/1; MG/1
1 TABLET ORAL DAILY
Status: DISCONTINUED | OUTPATIENT
Start: 2025-01-26 | End: 2025-01-28 | Stop reason: HOSPADM

## 2025-01-26 RX ORDER — DIPHENOXYLATE HYDROCHLORIDE AND ATROPINE SULFATE 2.5; .025 MG/1; MG/1
2 TABLET ORAL EVERY 6 HOURS PRN
Status: DISCONTINUED | OUTPATIENT
Start: 2025-01-26 | End: 2025-01-26

## 2025-01-26 RX ORDER — OXYTOCIN-SODIUM CHLORIDE 0.9% IV SOLN 30 UNIT/500ML 30-0.9/5 UT/ML-%
95 SOLUTION INTRAVENOUS ONCE AS NEEDED
Status: DISCONTINUED | OUTPATIENT
Start: 2025-01-26 | End: 2025-01-28 | Stop reason: HOSPADM

## 2025-01-26 RX ORDER — OXYTOCIN-SODIUM CHLORIDE 0.9% IV SOLN 30 UNIT/500ML 30-0.9/5 UT/ML-%
10 SOLUTION INTRAVENOUS ONCE AS NEEDED
Status: DISCONTINUED | OUTPATIENT
Start: 2025-01-26 | End: 2025-01-26

## 2025-01-26 RX ORDER — MISOPROSTOL 200 UG/1
800 TABLET ORAL ONCE AS NEEDED
Status: DISCONTINUED | OUTPATIENT
Start: 2025-01-26 | End: 2025-01-26

## 2025-01-26 RX ORDER — SODIUM CITRATE AND CITRIC ACID MONOHYDRATE 334; 500 MG/5ML; MG/5ML
30 SOLUTION ORAL ONCE
Status: CANCELLED | OUTPATIENT
Start: 2025-01-26 | End: 2025-01-26

## 2025-01-26 RX ORDER — MISOPROSTOL 200 UG/1
800 TABLET ORAL ONCE AS NEEDED
Status: DISCONTINUED | OUTPATIENT
Start: 2025-01-26 | End: 2025-01-28 | Stop reason: HOSPADM

## 2025-01-26 RX ORDER — CARBOPROST TROMETHAMINE 250 UG/ML
250 INJECTION, SOLUTION INTRAMUSCULAR
Status: DISCONTINUED | OUTPATIENT
Start: 2025-01-26 | End: 2025-01-26

## 2025-01-26 RX ORDER — FENTANYL/BUPIVACAINE/NS/PF 2MCG/ML-.1
PLASTIC BAG, INJECTION (ML) INJECTION CONTINUOUS
Status: CANCELLED | OUTPATIENT
Start: 2025-01-26

## 2025-01-26 RX ORDER — OXYTOCIN 10 [USP'U]/ML
10 INJECTION, SOLUTION INTRAMUSCULAR; INTRAVENOUS ONCE AS NEEDED
Status: DISCONTINUED | OUTPATIENT
Start: 2025-01-26 | End: 2025-01-28 | Stop reason: HOSPADM

## 2025-01-26 RX ORDER — LIDOCAINE HYDROCHLORIDE AND EPINEPHRINE 15; 5 MG/ML; UG/ML
INJECTION, SOLUTION EPIDURAL
Status: DISCONTINUED | OUTPATIENT
Start: 2025-01-26 | End: 2025-01-26

## 2025-01-26 RX ORDER — SODIUM CHLORIDE 0.9 % (FLUSH) 0.9 %
10 SYRINGE (ML) INJECTION EVERY 6 HOURS PRN
Status: DISCONTINUED | OUTPATIENT
Start: 2025-01-26 | End: 2025-01-28 | Stop reason: HOSPADM

## 2025-01-26 RX ORDER — HYDROCODONE BITARTRATE AND ACETAMINOPHEN 10; 325 MG/1; MG/1
1 TABLET ORAL EVERY 4 HOURS PRN
Status: DISCONTINUED | OUTPATIENT
Start: 2025-01-26 | End: 2025-01-28 | Stop reason: HOSPADM

## 2025-01-26 RX ORDER — OXYTOCIN-SODIUM CHLORIDE 0.9% IV SOLN 30 UNIT/500ML 30-0.9/5 UT/ML-%
10 SOLUTION INTRAVENOUS ONCE AS NEEDED
Status: DISCONTINUED | OUTPATIENT
Start: 2025-01-26 | End: 2025-01-28 | Stop reason: HOSPADM

## 2025-01-26 RX ORDER — TRANEXAMIC ACID 10 MG/ML
1000 INJECTION, SOLUTION INTRAVENOUS EVERY 30 MIN PRN
Status: DISCONTINUED | OUTPATIENT
Start: 2025-01-26 | End: 2025-01-26

## 2025-01-26 RX ORDER — DOCUSATE SODIUM 100 MG/1
200 CAPSULE, LIQUID FILLED ORAL 2 TIMES DAILY PRN
Status: DISCONTINUED | OUTPATIENT
Start: 2025-01-26 | End: 2025-01-28 | Stop reason: HOSPADM

## 2025-01-26 RX ORDER — METHYLERGONOVINE MALEATE 0.2 MG/ML
200 INJECTION INTRAVENOUS ONCE AS NEEDED
Status: DISCONTINUED | OUTPATIENT
Start: 2025-01-26 | End: 2025-01-26

## 2025-01-26 RX ORDER — DIPHENHYDRAMINE HYDROCHLORIDE 50 MG/ML
25 INJECTION INTRAMUSCULAR; INTRAVENOUS EVERY 4 HOURS PRN
Status: DISCONTINUED | OUTPATIENT
Start: 2025-01-26 | End: 2025-01-28 | Stop reason: HOSPADM

## 2025-01-26 RX ORDER — FENTANYL/BUPIVACAINE/NS/PF 2MCG/ML-.1
PLASTIC BAG, INJECTION (ML) INJECTION
Status: COMPLETED
Start: 2025-01-26 | End: 2025-01-26

## 2025-01-26 RX ORDER — OXYTOCIN 10 [USP'U]/ML
10 INJECTION, SOLUTION INTRAMUSCULAR; INTRAVENOUS ONCE AS NEEDED
Status: DISCONTINUED | OUTPATIENT
Start: 2025-01-26 | End: 2025-01-26

## 2025-01-26 RX ORDER — TRANEXAMIC ACID 10 MG/ML
1000 INJECTION, SOLUTION INTRAVENOUS EVERY 30 MIN PRN
Status: DISCONTINUED | OUTPATIENT
Start: 2025-01-26 | End: 2025-01-28 | Stop reason: HOSPADM

## 2025-01-26 RX ORDER — SIMETHICONE 80 MG
1 TABLET,CHEWABLE ORAL EVERY 6 HOURS PRN
Status: DISCONTINUED | OUTPATIENT
Start: 2025-01-26 | End: 2025-01-28 | Stop reason: HOSPADM

## 2025-01-26 RX ORDER — CARBOPROST TROMETHAMINE 250 UG/ML
250 INJECTION, SOLUTION INTRAMUSCULAR
Status: DISCONTINUED | OUTPATIENT
Start: 2025-01-26 | End: 2025-01-28 | Stop reason: HOSPADM

## 2025-01-26 RX ORDER — DIPHENHYDRAMINE HCL 25 MG
25 CAPSULE ORAL EVERY 4 HOURS PRN
Status: DISCONTINUED | OUTPATIENT
Start: 2025-01-26 | End: 2025-01-28 | Stop reason: HOSPADM

## 2025-01-26 RX ADMIN — SODIUM CHLORIDE, POTASSIUM CHLORIDE, SODIUM LACTATE AND CALCIUM CHLORIDE: 600; 310; 30; 20 INJECTION, SOLUTION INTRAVENOUS at 01:01

## 2025-01-26 RX ADMIN — OXYTOCIN-SODIUM CHLORIDE 0.9% IV SOLN 30 UNIT/500ML 10 UNITS: 30-0.9/5 SOLUTION at 07:01

## 2025-01-26 RX ADMIN — SODIUM CHLORIDE, POTASSIUM CHLORIDE, SODIUM LACTATE AND CALCIUM CHLORIDE 500 ML: 600; 310; 30; 20 INJECTION, SOLUTION INTRAVENOUS at 08:01

## 2025-01-26 RX ADMIN — FENTANYL CITRATE 3 ML: 50 INJECTION INTRAMUSCULAR; INTRAVENOUS at 01:01

## 2025-01-26 RX ADMIN — IBUPROFEN 600 MG: 600 TABLET, FILM COATED ORAL at 06:01

## 2025-01-26 RX ADMIN — LIDOCAINE HYDROCHLORIDE,EPINEPHRINE BITARTRATE 3 ML: 15; .005 INJECTION, SOLUTION EPIDURAL; INFILTRATION; INTRACAUDAL; PERINEURAL at 01:01

## 2025-01-26 RX ADMIN — IBUPROFEN 600 MG: 600 TABLET, FILM COATED ORAL at 01:01

## 2025-01-26 RX ADMIN — DOCUSATE SODIUM 200 MG: 100 CAPSULE, LIQUID FILLED ORAL at 09:01

## 2025-01-26 RX ADMIN — LEVOTHYROXINE SODIUM 25 MCG: 25 TABLET ORAL at 08:01

## 2025-01-26 RX ADMIN — IBUPROFEN 600 MG: 600 TABLET, FILM COATED ORAL at 11:01

## 2025-01-26 RX ADMIN — FENTANYL CITRATE 8 ML/HR: 50 INJECTION INTRAMUSCULAR; INTRAVENOUS at 01:01

## 2025-01-26 NOTE — ANESTHESIA PROCEDURE NOTES
Epidural    Patient location during procedure: OB   Reason for block: primary anesthetic   Reason for block: labor analgesia requested by patient and obstetrician  Diagnosis: IUP   Start time: 1/26/2025 1:30 AM  Timeout: 1/26/2025 1:30 AM  End time: 1/26/2025 1:42 AM  Surgery related to: Vaginal Delivery    Staffing  Performing Provider: Devonte Rosa DO  Authorizing Provider: Kristina Bo MD    Staffing  Performed by: Devonte Rosa DO  Authorized by: Kristina Bo MD        Preanesthetic Checklist  Completed: patient identified, IV checked, site marked, risks and benefits discussed, surgical consent, monitors and equipment checked, pre-op evaluation, timeout performed, anesthesia consent given, hand hygiene performed and patient being monitored  Preparation  Patient position: sitting  Prep: ChloraPrep  Patient monitoring: Pulse Ox  Reason for block: primary anesthetic   Epidural  Skin Anesthetic: lidocaine 1%  Skin Wheal: 3 mL  Administration type: continuous  Approach: midline  Interspace: L3-4    Injection technique: JCARLOS saline  Needle and Epidural Catheter  Needle type: Tuohy   Needle gauge: 17  Needle length: 3.5 inches  Needle insertion depth: 4 cm  Catheter type: Magma HQ  Catheter size: 19 G  Catheter at skin depth: 8 cm  Insertion Attempts: 1  Test dose: 3 mL of lidocaine 1.5% with Epi 1-to-200,000  Additional Documentation: incremental injection, negative aspiration for heme and CSF, no paresthesia on injection, no signs/symptoms of IV or SA injection, no significant pain on injection and no significant complaints from patient  Needle localization: anatomical landmarks  Medications:  Volume per aspiration: 5 mL  Time between aspirations: 5 minutes   Assessment  Ease of block: easy  Patient's tolerance of the procedure: comfortable throughout block and no complaints  Additional Notes  Attempted dural puncture, no CSF return, but patient felt unilateral paraesthesia No inadvertent  dural puncture with Tuohy.  Dural puncture performed with spinal needle.

## 2025-01-26 NOTE — CARE UPDATE
Resident to bedside after staff assist. Patient was in bathroom with nursing after vagal episode. Patient moved to bed. VSS. Fundus firm, no additional bleeding. Minimal blood loss during delivery with high starting CBC. Will give fluid bolus and monitor longer on L&D.    Fernanda Swan MD  Obstetrics and Gynecology, PGY-2

## 2025-01-26 NOTE — DISCHARGE INSTRUCTIONS
Community Resources for Breastfeeding Mothers:   Hospital Breastfeeding Centers/ Lactation Consultants:   [facility]........................................................................................[phone]  Outpatient Lactation Consults               [phone]   [facility]....................................................................................[phone]   [facility]..........................................................[phone]   [facility].................................................[phone]   [facility].......................................................[phone]   [facility].................[phone]   [facility].......................................[phone]   [facility]..................................................[phone]   [facility].....................................................................[phone]      [facility] (Poison Control)...........[phone]  PoisonHelp.org   Free medical advice 24/7 through the Poison Help Line and the online tool      Online Resources:   International Breastfeeding [place] ...............................................................................ibconline.ca   Dr Stevo Molina online resource provides videos, articles, and information sheets.     Coeffective...............................................................................................................coeffective.com   Download the free mobile debra to help get off to a great start with breastfeeding.   Droplet.....................................................................................................................Soicos.com   Global  Health Media...........................................................................................GlobalServe.org   Videos that teach and empower mothers and caregivers   [facility].............................................................................[phone]      THERAVECTYS   Provides up to date information for medication use by moms during pregnancy and while breastfeeding.   Whit Alfaro....................................................................................................................kellymom.com   Provides online information on breastfeeding and parenting      La Leche League........................................................................................ lllalmsla.org   /   llli.org   Mother-to-mother support groups with education, information support, and encouragement    Work and Pump........................................................................................... [facility] OKCD.com   Information about breastfeeding for working moms     [place] Resources:   [place] Breastfeeding Coalition............................... [phone]    [facility]   Find local breastfeeding support   [place] Breastfeeding Support............................................................ LaBreastfeedingSport.org   Zip code search of breastfeeding resources in your area   Partners for Healthy Babies............................................................[phone]   7780871qytc.org   Connects [place] moms and their families to health and pregnancy resources.  24/7   WIC (Women, Infant, Children)......................................................... 1-912-847-1204   ldh.la.gov/WIC   Download the OpenROV debra, get code from WIC office    [place] Resources:     Baby Cafe............................................................................................................. babycafeusa.org   Free, drop in, informal  breastfeeding support groups offering professional lactation care and intervention.    [facility] [place] [facility]....................................... birthmarkHematris Wound Care.com   Infant feeding drop in clinics, Lactation services, support groups, education programs   [facility]...............................................[phone]   Riiid.com/groups/[facility]   Free breastfeeding support group for families of color   Mothers Milk Bank [facility]....................................................[phone]                                                             [employer]WE[employer].Act-On Software/services/mothers-milk-bank[facility]   RAJEEV Nesting..................................................................................[phone] nolanesting.com   In person and virtual support for families through pregnancy, birth, and early parenthood.       [facility] [place]- Dr. Mamta Mcdaniels.......................[phone]   [address] [place] [address]                                  [email or web address]   [email or web address]   [facility], [facility] (Lilly Barnett RN, IBCLC) ............................[phone] [email or web address]    Healthy Start [place].....................................[phone] ([place])  [phone] ([place])   [facility].gov/health-department/healthy-start   Serves women of childbearing age and addresses issues for pregnant women and their children from birth  to age two.          La Leche League- [place]............................. Syllabuster.Mix[place]/ Riiid.MixVille/ Filtrboxchari   In person and virtual mother to mother support groups with education, information support and   encouragement to women who want to breastfeed      [place] Resources:   Breastfeeding Resources- [facility]  Health.....msdh.ms.gov (under womens services)   Find resources and info about planning for breastfeeding, its benefits, and help with breastfeeding  s uccessfully.    [facility] Cho[facility]- [place]....................................... BUX   [phone]   [address] [place], [place]. Call or text 24/7   [facility].......................................................[email or web address]   [facility]actation Consultants sere [facility], including [facility],   [facility], and surrounding areas.    [place] Breastfeeding Coalition...............................................................................msbfc.org   Promotes and supports breastfeeding with families, health providers, and communities.   [facility] breastfeeding Coalition.....................................................................smbfc.org   Find breastfeeding resources and support groups in your area.    WIC Nutrition Program- [facility] of Health.................................... [place].gov

## 2025-01-26 NOTE — L&D DELIVERY NOTE
Vaginal Delivery Note    Normal spontaneous vaginal delivery of live female infant after approximately 2 hours 12 minutes of maternal pushing effort. Infant delivered in HILARIO presentation. No nuchal cord was noted with delivery. Infant was placed on mothers abdomen for skin to skin and bulb suctioning performed. Delayed cord clamping was performed. Cord was cut and clamped and cord blood was not collected.    Spontaneous delivery of placenta with gentle traction applied. IV pitocin was given noting good uterine tone. No trailing membranes were noted on bimanual examination. Placenta was inspected and noted to be intact.    2nd degree laceration noted and repaired in normal fashion with 2-0 vicryl on CT-1  A left labial extension was noted and repaired with 3-0 vicryl in running fashion..    Patient tolerated delivery well. Sponge, needle, and lap counted correctly x2.  cc.     Delivery Information for Eufemia Corona    Birth information:  YOB: 2025   Time of birth: 7:12 AM   Sex: female   Head Delivery Date/Time: 2025  7:12 AM   Delivery type: Vaginal, Spontaneous   Gestational Age: 38w2d       Delivery Providers    Delivering clinician: Margy Bedolla MD   Provider Role    Stew Phillips RN Delivery Nurse    Kristina Chatterjee RN Nurse              Measurements    Weight:   Length:          Apgars    Living status: Living  Apgar Component Scores:  1 min.:  5 min.:  10 min.:  15 min.:  20 min.:    Skin color:  1  1       Heart rate:  2  2       Reflex irritability:  2  2       Muscle tone:  2  2       Respiratory effort:  2  2       Total:  9  9       Apgars assigned by: JO CHATTERJEE RN         Operative Delivery    Forceps attempted?: No  Vacuum extractor attempted?: No         Shoulder Dystocia    Shoulder dystocia present?: No           Presentation    Presentation: Vertex  Position: Left Occiput Anterior           Interventions/Resuscitation    Method: Bulb Suctioning,  Tactile Stimulation       Cord    Vessels: 3 vessels  Complications: None  Delayed Cord Clamping?: Yes  Cord Clamped Date/Time: 2025  7:13 AM  Cord Blood Disposition: Discarded  Gases Sent?: No  Stem Cell Collection (by MD): No       Placenta    Placenta delivery date/time: 2025 0717  Placenta removal: Expressed  Placenta appearance: Intact  Placenta disposition: Discarded           Labor Events:       labor:       Labor Onset Date/Time:         Dilation Complete Date/Time:         Start Pushing Date/Time:       Rupture Date/Time: 25 07        Rupture type: SRM (Spontaneous Rupture)        Fluid Amount:       Fluid Color: Clear      steroids:       Antibiotics given for GBS:       Induction:       Indications for induction:        Augmentation:       Indications for augmentation:       Labor complications:       Additional complications:          Cervical ripening:                     Delivery:      Episiotomy: None     Indication for Episiotomy:       Perineal Lacerations: 2nd Repaired:  Yes   Periurethral Laceration:   Repaired:     Labial Laceration: left Repaired: Yes   Sulcus Laceration:   Repaired:     Vaginal Laceration:   Repaired:     Cervical Laceration:   Repaired:     Repair suture:       Repair # of packets: 3     Last Value - EBL - Nursing (mL):       Sum - EBL - Nursing (mL): 0     Last Value - EBL - Anesthesia (mL):      Calculated QBL (mL):       Running total QBL (mL):       Vaginal Sweep Performed: Yes     Surgicount Correct: Yes       Other providers:       Anesthesia    Method: Epidural          Details (if applicable):  Trial of Labor      Categorization:      Priority:     Indications for :     Incision Type:       Additional  information:  Forceps:    Vacuum:    Breech:    Observed anomalies    Other (Comments):         Margy Bedolla MD  Department of Obstetrics & Gynecology  [facility]

## 2025-01-26 NOTE — PROGRESS NOTES
Labor Progress Note        Subjective:      Patient currently complaining of contraction pain.     Objective:      Temp:  [97.6 °F (36.4 °C)-98.3 °F (36.8 °C)] 97.7 °F (36.5 °C)  Pulse:  [61-99] 80  Resp:  [17-18] 17  SpO2:  [96 %-100 %] 100 %  BP: ()/(42-85) 91/54  There is no height or weight on file to calculate BMI.     General: no acute distress  Electronic Fetal Monitoring:  FHT: Category: 1                 TOCO: Contractions: regular, every 1-2 minutes     Assessment:     1. IUP at  here for treatment of PROM     Plan:     1. Continue active management of labor.   2. Reassuring FHT  3. Epidural no but asking for it  4. Membranes ruptured yes.   5. Cervix:4/70/-3   6. Recheck in 2-4 hours or prn.

## 2025-01-26 NOTE — PROGRESS NOTES
Labor Progress Note        Subjective:      Patient currently doing well without complaints.     Objective:      Temp:  [97.6 °F (36.4 °C)-98.3 °F (36.8 °C)] 98 °F (36.7 °C)  Pulse:  [61-99] 66  Resp:  [17-18] 17  SpO2:  [96 %-100 %] 97 %  BP: ()/(42-85) 96/53  There is no height or weight on file to calculate BMI.     General: no acute distress  Electronic Fetal Monitoring:  FHT: Category: 1                 TOCO: Contractions: regular, every 2 minutes     Assessment:     1. IUP at  here for IOL 2/2 PROM     Plan:     1. Continue active management of labor.   2. Reassuring FHT  3. Epidural yes.   4. Membranes ruptured yes.   5. Cervix:5/80/-2   6. Recheck in 2-4 hours or prn.

## 2025-01-26 NOTE — PLAN OF CARE
-admitted for SROM.on pitocin augmentation currently at 16milliunits/min. Not on epidural, with pain score of 5/10, tolerating well. On RBS monitoring. With CAT1 tracing. All questions answered and safety ensured. at bedside.    Problem: Diabetes in Pregnancy  Goal: Optimal Coping  Outcome: Progressing  Goal: Blood Glucose Level Within Targeted Range  Outcome: Progressing     Problem:  Fall Injury Risk  Goal: Absence of Fall, Infant Drop and Related Injury  Outcome: Progressing     Problem: Adult Inpatient Plan of Care  Goal: Plan of Care Review  Outcome: Progressing  Goal: Patient-Specific Goal (Individualized)  Outcome: Progressing  Goal: Absence of Hospital-Acquired Illness or Injury    Outcome: Progressing  Goal: Optimal Comfort and Wellbeing  Outcome: Progressing  Goal: Readiness for Transition of Care  Outcome: Progressing     Problem: Infection  Goal: Absence of Infection Signs and Symptoms  Outcome: Progressing     Problem: Labor  Goal: Hemostasis  Outcome: Progressing  Goal: Stable Fetal Wellbeing  Outcome: Progressing  Goal: Effective Progression to Delivery  Outcome: Progressing  Goal: Absence of Infection Signs and Symptoms  Outcome: Progressing  Goal: Acceptable Pain Control  Outcome: Progressing  Goal: Normal Uterine Contraction Pattern  Outcome: Progressing

## 2025-01-26 NOTE — PROGRESS NOTES
Labor Progress Note        Subjective:      Patient currently doing well without complaints.     Glucose 63    Objective:      Temp:  [97.6 °F (36.4 °C)-98.3 °F (36.8 °C)] 98 °F (36.7 °C)  Pulse:  [63-99] 63  Resp:  [17-18] 18  SpO2:  [97 %-100 %] 98 %  BP: ()/(55-85) 100/58  There is no height or weight on file to calculate BMI.     General: no acute distress  Electronic Fetal Monitoring:  FHT: Category: 1                 TOCO: Contractions: regular, every 2 minutes     Assessment:     1. IUP at  here for induction of labor 2/2 PROM     Plan:     1. Continue active management of labor.  Pitocin @ 20  2. Reassuring FHT  3. Epidural not yet - considering.   4. Membranes ruptured yes.   5. Cervix:3/70/-2   6. Recheck in 2-4 hours or prn.

## 2025-01-27 LAB
BASOPHILS # BLD AUTO: 0.06 K/UL (ref 0–0.2)
BASOPHILS NFR BLD: 0.3 % (ref 0–1.9)
DIFFERENTIAL METHOD BLD: ABNORMAL
EOSINOPHIL # BLD AUTO: 0.1 K/UL (ref 0–0.5)
EOSINOPHIL NFR BLD: 0.8 % (ref 0–8)
ERYTHROCYTE [DISTWIDTH] IN BLOOD BY AUTOMATED COUNT: 14 % (ref 11.5–14.5)
HCT VFR BLD AUTO: 29.7 % (ref 37–48.5)
HGB BLD-MCNC: 9.7 G/DL (ref 12–16)
IMM GRANULOCYTES # BLD AUTO: 0.11 K/UL (ref 0–0.04)
IMM GRANULOCYTES NFR BLD AUTO: 0.6 % (ref 0–0.5)
LYMPHOCYTES # BLD AUTO: 3.1 K/UL (ref 1–4.8)
LYMPHOCYTES NFR BLD: 17.7 % (ref 18–48)
MCH RBC QN AUTO: 30.3 PG (ref 27–31)
MCHC RBC AUTO-ENTMCNC: 32.7 G/DL (ref 32–36)
MCV RBC AUTO: 93 FL (ref 82–98)
MONOCYTES # BLD AUTO: 1.4 K/UL (ref 0.3–1)
MONOCYTES NFR BLD: 7.9 % (ref 4–15)
NEUTROPHILS # BLD AUTO: 12.6 K/UL (ref 1.8–7.7)
NEUTROPHILS NFR BLD: 72.7 % (ref 38–73)
NRBC BLD-RTO: 0 /100 WBC
PLATELET # BLD AUTO: 189 K/UL (ref 150–450)
PMV BLD AUTO: 11 FL (ref 9.2–12.9)
POCT GLUCOSE: 89 MG/DL (ref 70–110)
RBC # BLD AUTO: 3.2 M/UL (ref 4–5.4)
WBC # BLD AUTO: 17.3 K/UL (ref 3.9–12.7)

## 2025-01-27 PROCEDURE — 25000003 PHARM REV CODE 250

## 2025-01-27 PROCEDURE — 11000001 HC ACUTE MED/SURG PRIVATE ROOM

## 2025-01-27 PROCEDURE — 85025 COMPLETE CBC W/AUTO DIFF WBC: CPT

## 2025-01-27 PROCEDURE — 36415 COLL VENOUS BLD VENIPUNCTURE: CPT

## 2025-01-27 RX ADMIN — DOCUSATE SODIUM 200 MG: 100 CAPSULE, LIQUID FILLED ORAL at 08:01

## 2025-01-27 RX ADMIN — DOCUSATE SODIUM 200 MG: 100 CAPSULE, LIQUID FILLED ORAL at 09:01

## 2025-01-27 RX ADMIN — IBUPROFEN 600 MG: 600 TABLET, FILM COATED ORAL at 05:01

## 2025-01-27 RX ADMIN — LEVOTHYROXINE SODIUM 25 MCG: 25 TABLET ORAL at 05:01

## 2025-01-27 RX ADMIN — IBUPROFEN 600 MG: 600 TABLET, FILM COATED ORAL at 12:01

## 2025-01-27 RX ADMIN — PRENATAL VIT W/ FE FUMARATE-FA TAB 27-0.8 MG 1 TABLET: 27-0.8 TAB at 08:01

## 2025-01-27 NOTE — PLAN OF CARE
Lactation note:  Lactation consultant's first visit with mother. Reviewed first day expectations for breastfeeding using the postpartum guide. We discussed that colostrum is adequate and important to feed baby the first few days of life and will help with blood glucose. Mom assisted with hand expression, then latching baby in cross cradle hold to both breasts. Infant nursing effectively with audible swallows. Father of baby assisted as well. Baby left skin to skin. The feeding plan was reviewed. The mother will feed infant with cues 8 or more times in 24 hours until content, wake at least every 2-3 hours due to help maintain glucose maintenance. Left  phone number on board for patient to call for assistance.

## 2025-01-27 NOTE — LACTATION NOTE
"   01/26/25 5436   Maternal Assessment   Breast Shape Bilateral:;round   Breast Density Bilateral:;soft   Areola Bilateral:;elastic   Nipples Bilateral:;everted;short   Maternal Infant Feeding   Maternal Emotional State assist needed   Infant Positioning cross-cradle   Signs of Milk Transfer audible swallow;infant jaw motion present   Pain with Feeding yes  ("1")   Pain Location nipples, bilateral   Pain Description soreness   Comfort Measures Before/During Feeding infant position adjusted;latch adjusted   Nipple Shape After Feeding, Left round   Nipple Shape After Feeding, Right slightly slanted   Latch Assistance yes   Breast Pumping   Breast Pumping hand expression utilized       "

## 2025-01-27 NOTE — PLAN OF CARE
VSS. Pain controlled with scheduled oral pain medication. Breastfeeding. Fundus firm and midline with light to moderate lochia rubra. Voiding spontaneously with adequate output. Passing gas. No concerns at this time.        (1) Other Diagnosis

## 2025-01-27 NOTE — LACTATION NOTE
01/27/25 1130   Maternal Assessment   Breast Shape Bilateral:;round   Breast Density Bilateral:;soft   Areola Bilateral:;elastic   Nipples Bilateral:;everted;graspable   Maternal Infant Feeding   Maternal Emotional State relaxed;assist needed   Infant Positioning cross-cradle   Signs of Milk Transfer infant jaw motion present   Pain with Feeding yes   Pain Location nipples, bilateral   Pain Description soreness   Comfort Measures Before/During Feeding infant position adjusted;maternal position adjusted   Nipple Shape After Feeding, Right round   Latch Assistance yes  (minimal positioning and latch assistance provided)     Visited patient in room, baby latched onto R breast, cradle position, assistance provided to hold baby closer to her body and more chest-to-chest, baby actively sucking.  Baby self-removed.  Minimal positioning and latch assistance provided, L breast, cross cradle position, deep comfortable latch achieved, baby actively sucking.  Basic education provided, Guide reviewed.  Encouraged to call for assistance prn.

## 2025-01-27 NOTE — PLAN OF CARE
"Plan of care:  Mother will nurse baby on cue " 8 or more in 24" and lengthen feedings until content; will achieve a deep, comfortable latch and observe for signs of milk transfer; will monitor baby's 24hr diaper counts; will call for  assistance prn.   "

## 2025-01-27 NOTE — PROGRESS NOTES
[facility])  Obstetrics  Postpartum Progress Note    Patient Name: Susan Corona  MRN: [id number]  Admission Date: 2025  Hospital Length of Stay: 2 days  Attending Physician: Margy Bedolla MD  Primary Care Provider: Frank Louis MD    Subjective:     Principal Problem: (spontaneous vaginal delivery)    Hospital Course:  IOL after PROM at 38+ 2/7 weeks. Infant girl. 2nd degree and left labial laceration. GDMA1 (well controlled), Hypothyroidism (tx with Synthroid), GTP (140 at admit, 180 PP).     25- PP day #1. She is doing well this morning. VSS. Normotensive and afebrile. Mild anemia in pregnancy and  following delivery, asymptomatic (Hgb/Hcg 12/34 at admit, decreased to 10/30 PP.  at admit, 180 PP. Continue po Iron. Fasting BS wnl at 89. No further glucose monitoring indicated. She is tolerating a regular diet without nausea or vomiting. She is voiding spontaneously. She is ambulating without feeling dizzy or lightheaded. She has  passed flatus, and has not a BM. Vaginal bleeding is mild. She denies fever or chills. Abdominal pain is mild and controlled with oral medications. She Is breastfeeding. To see lactation today. Denies hx of anxiety or depression. Unsure of desired method of contraception and will discuss with primary OB at 6 weeks PP visit.    /60 (BP Location: Right arm, Patient Position: Sitting)   Pulse 86   Temp 97.7 °F (36.5 °C) (Oral)   Resp 16   LMP 2024 (Exact Date)   SpO2 98%   Breastfeeding Yes     Gen: A&O x 4, NAD  Breasts: bilaterally soft, non-tender, nipples intact   Abdomen: soft, non-tender, uterus firm at U - 1 fb  Perineum: approximated, no edema   Lochia: minimal rubra  Ext: bilaterally no pedal edema, without signs of DVT    No new subjective & objective note has been filed under this hospital service since the last note was generated.    Assessment/Plan:     29 y.o. female  for:    *  (spontaneous  vaginal delivery)  Routine PP care    Obstetrical laceration  Self care/perry care discussed    Benign gestational thrombocytopenia in third trimester   at admit. 180 PP.  Consider repeat at 6 week PP visit    Thyroid disease affecting pregnancy  Continue oral Synthroid    Diet controlled gestational diabetes mellitus (GDM) in third trimester  Fast BS wnl at 89    Anemia during pregnancy in second trimester  Asymptomatic, continue po iron.   Iron rich diet encouraged.      Disposition: As patient meets milestones, will plan to discharge.  Likely d/c home tomorrow.    Gay Ortiz CNM  Obstetrics  [facility] ([place])

## 2025-01-27 NOTE — PLAN OF CARE
Pt ambulating and voiding without difficulty. Patient safety maintained, side rails up, bed low and locked position.  Pain well controlled with scheduled pain medication. Fundus midline, firm, with moderate lochia. VSS. Significant other at bedside; parents responding to infant cues and bonding appropriately. Repeat CBC pending. Fasting BG 89.

## 2025-01-27 NOTE — HOSPITAL COURSE
IOL after PROM at 38+ 2/7 weeks. Infant girl. 2nd degree and left labial laceration. GDMA1 (well controlled), Hypothyroidism (tx with Synthroid), GTP (140 at admit, 180 PP).     1/27/25- PP day #1. She is doing well this morning. VSS. Normotensive and afebrile. Mild anemia in pregnancy and  following delivery, asymptomatic (Hgb/Hcg 12/34 at admit, decreased to 10/30 PP.  at admit, 180 PP. Continue po Iron. Fasting BS wnl at 89. No further glucose monitoring indicated. She is tolerating a regular diet without nausea or vomiting. She is voiding spontaneously. She is ambulating without feeling dizzy or lightheaded. She has  passed flatus, and has not a BM. Vaginal bleeding is mild. She denies fever or chills. Abdominal pain is mild and controlled with oral medications. She Is breastfeeding. To see lactation today. Denies hx of anxiety or depression. Unsure of desired method of contraception and will discuss with primary OB at 6 weeks PP visit.    1/28/25: PPD2: Doing well, normal involution and lochia, breast/ bottle feeding without difficulty, d/c home today. Continue Synthroid daily. Anticipate 2 hour glucose test PP.   All PP warning signs and education done. Discussed when to report to [facility] vs call clinic. eRX sent to pharmacy. Follow up in office x 6 weeks for routine PP visit.

## 2025-01-27 NOTE — ANESTHESIA POSTPROCEDURE EVALUATION
Anesthesia Post Evaluation    Patient: Susan Corona    Procedure(s) Performed: * No procedures listed *    Final Anesthesia Type: epidural      Patient location during evaluation: floor  Patient participation: Yes- Able to Participate  Level of consciousness: awake and alert  Post-procedure vital signs: reviewed and stable  Pain management: adequate  Airway patency: patent  JOAQUINA mitigation strategies: Multimodal analgesia and Use of major conduction anesthesia (spinal/epidural) or peripheral nerve block  PONV status at discharge: No PONV  Anesthetic complications: no      Cardiovascular status: stable  Respiratory status: unassisted, spontaneous ventilation and room air  Hydration status: euvolemic  Follow-up not needed.              Vitals Value Taken Time   /60 01/27/25 0818   Temp 36.5 °C (97.7 °F) 01/27/25 0818   Pulse 86 01/27/25 0818   Resp 16 01/27/25 0818   SpO2 98 % 01/27/25 0818         No case tracking events are documented in the log.      Pain/Cirilo Score: Pain Rating Prior to Med Admin: 4 (1/27/2025  5:29 AM)  Pain Rating Post Med Admin: 2 (1/27/2025  6:29 AM)

## 2025-01-28 ENCOUNTER — TELEPHONE (OUTPATIENT)
Dept: OBSTETRICS AND GYNECOLOGY | Facility: CLINIC | Age: 30
End: 2025-01-28
Payer: COMMERCIAL

## 2025-01-28 VITALS
OXYGEN SATURATION: 97 % | SYSTOLIC BLOOD PRESSURE: 118 MMHG | HEART RATE: 70 BPM | DIASTOLIC BLOOD PRESSURE: 59 MMHG | RESPIRATION RATE: 18 BRPM | TEMPERATURE: 99 F

## 2025-01-28 DIAGNOSIS — O99.119 BENIGN GESTATIONAL THROMBOCYTOPENIA, ANTEPARTUM: ICD-10-CM

## 2025-01-28 DIAGNOSIS — D69.6 BENIGN GESTATIONAL THROMBOCYTOPENIA, ANTEPARTUM: ICD-10-CM

## 2025-01-28 DIAGNOSIS — O24.410 DIET CONTROLLED GESTATIONAL DIABETES MELLITUS (GDM), ANTEPARTUM: Primary | ICD-10-CM

## 2025-01-28 PROCEDURE — 25000003 PHARM REV CODE 250

## 2025-01-28 RX ORDER — IBUPROFEN 600 MG/1
600 TABLET ORAL EVERY 6 HOURS PRN
Qty: 60 TABLET | Refills: 0 | Status: SHIPPED | OUTPATIENT
Start: 2025-01-28

## 2025-01-28 RX ADMIN — DOCUSATE SODIUM 200 MG: 100 CAPSULE, LIQUID FILLED ORAL at 08:01

## 2025-01-28 RX ADMIN — PRENATAL VIT W/ FE FUMARATE-FA TAB 27-0.8 MG 1 TABLET: 27-0.8 TAB at 08:01

## 2025-01-28 RX ADMIN — IBUPROFEN 600 MG: 600 TABLET, FILM COATED ORAL at 08:01

## 2025-01-28 RX ADMIN — IBUPROFEN 600 MG: 600 TABLET, FILM COATED ORAL at 02:01

## 2025-01-28 RX ADMIN — LEVOTHYROXINE SODIUM 25 MCG: 25 TABLET ORAL at 05:01

## 2025-01-28 NOTE — LACTATION NOTE
Pt to begin pumping d/t intermittent formula supplementation. Pump brought to room & education provided w/ the information listed below.    Niko Niko Symphony pump, tubing, collections containers and labels brought to bedside.  Discussed proper pump setting of initiation phase.  Instructed on proper usage of pump and to adjust suction according to maximum comfort level.  Verified appropriate flange fit.  Educated on the frequency and duration of pumping in order to promote and maintain a full milk supply.  Hands on pumping technique reviewed.  Encouraged hand expression after pumping.  Instructed on cleaning of breast pump parts.  Written instructions also given.  Pt verbalized understanding and appropriate recall for proper milk handling, collection, labeling, storage and transportation.

## 2025-01-28 NOTE — DISCHARGE SUMMARY
[facility] ([place])  Obstetrics  Discharge Summary      Patient Name: Susan Corona  MRN: [id number]  Admission Date: 1/25/2025  Hospital Length of Stay: 3 days  Discharge Date and Time:  01/28/2025 10:11 AM  Attending Physician: Margy Bedolla MD   Discharging Provider: Lizzie Butler CNM   Primary Care Provider: Frank Louis MD    HPI: No notes on file        * No surgery found *     Hospital Course:   IOL after PROM at 38+ 2/7 weeks. Infant girl. 2nd degree and left labial laceration. GDMA1 (well controlled), Hypothyroidism (tx with Synthroid), GTP (140 at admit, 180 PP).     1/27/25- PP day #1. She is doing well this morning. VSS. Normotensive and afebrile. Mild anemia in pregnancy and  following delivery, asymptomatic (Hgb/Hcg 12/34 at admit, decreased to 10/30 PP.  at admit, 180 PP. Continue po Iron. Fasting BS wnl at 89. No further glucose monitoring indicated. She is tolerating a regular diet without nausea or vomiting. She is voiding spontaneously. She is ambulating without feeling dizzy or lightheaded. She has  passed flatus, and has not a BM. Vaginal bleeding is mild. She denies fever or chills. Abdominal pain is mild and controlled with oral medications. She Is breastfeeding. To see lactation today. Denies hx of anxiety or depression. Unsure of desired method of contraception and will discuss with primary OB at 6 weeks PP visit.    1/28/25: PPD2: Doing well, normal involution and lochia, breast/ bottle feeding without difficulty, d/c home today. Continue Synthroid daily. Anticipate 2 hour glucose test PP.   All PP warning signs and education done. Discussed when to report to [facility] vs call clinic. eRX sent to pharmacy. Follow up in office x 6 weeks for routine PP visit.        Doing well, ambulating, voiding, and tolerating regular diet  Denies dizziness or light headed sensation. Denies SOB or difficulty breathing.   Denies headaches, visual disturbances or upper GI  pain, nausea, or vomiting.  Reports passing flatus. + BM.   Lochia: steadily decreasing  Pain: well controlled with Ibuprofen.   Breasts/nipples: Br + brian feeding  Depression/anxiety: denies   Support at home: good  Contraception: to discuss with primary OBGYN @ PP visit  : baby girl is doing well, will f/u with pediatrician.     Gen: A&O x 4, NAD  CV: normal HR  Lungs: normal resp effort  Breasts: bilaterally soft, non-tender, nipples intact   Abdomen: soft, non-tender, uterus firm at U - 2 fb  Perineum: approximated, no edema   Lochia: minimal rubra  Ext: bilaterally trace pedal edema, without signs of DVT       Consults (From admission, onward)          Status Ordering Provider     Inpatient consult to Lactation  Once        Provider:  (Not yet assigned)    Acknowledged [address]            Final Active Diagnoses:    Diagnosis Date Noted POA    PRINCIPAL PROBLEM:   (spontaneous vaginal delivery) [O80] 2025 Not Applicable    Obstetrical laceration [O71.9] 2025 No    Thyroid disease affecting pregnancy [O99.280, E07.9] 2025 Yes    Benign gestational thrombocytopenia in third trimester [O99.113, D69.6] 2025 Yes    Diet controlled gestational diabetes mellitus (GDM) in third trimester [O24.410] 11/15/2024 Yes    Anemia during pregnancy in second trimester [O99.012] 2024 Yes      Problems Resolved During this Admission:    Diagnosis Date Noted Date Resolved POA    Encounter for supervision of normal first pregnancy in third trimester [Z34.03] 10/04/2024 2025 Not Applicable        Significant Diagnostic Studies: Labs: All labs within the past 24 hours have been reviewed      Feeding Method: both breast and bottle    Immunizations       Date Immunization Status Dose Route/Site Given by    25 1207 MMR Incomplete 0.5 mL Subcutaneous/     25 1207 Tdap Incomplete 0.5 mL Intramuscular/             Delivery:    Episiotomy: None   Lacerations: 2nd   Repair  "suture:     Repair # of packets: 3   Blood loss (ml):       Birth information:  YOB: 2025   Time of birth: 7:12 AM   Sex: female   Delivery type: Vaginal, Spontaneous   Gestational Age: 38w2d     Measurements    Weight: 3130 g  Weight (lbs): 6 lb 14.4 oz  Length: 52.1 cm  Length (in): 20.5"  Head circumference: 34.3 cm  Chest circumference: 33 cm         Delivery Clinician: Delivery Providers    Delivering clinician: Margy Bedolla MD   Provider Role    Stew Phillips RN Delivery Nurse    Kristina Palacios RN Nurse             Additional  information:  Forceps:    Vacuum:    Breech:    Observed anomalies      Living?:     Apgars    Living status: Living  Apgar Component Scores:  1 min.:  5 min.:  10 min.:  15 min.:  20 min.:    Skin color:  1  1       Heart rate:  2  2       Reflex irritability:  2  2       Muscle tone:  2  2       Respiratory effort:  2  2       Total:  9  9       Apgars assigned by: JO PALACIOS RN         Placenta: Delivered:       appearance  Pending Diagnostic Studies:       None            Discharged Condition: stable    Disposition: Home or Self Care    Follow Up:   Follow-up Information       Margy Bedolla MD Follow up in 6 week(s).    Specialty: Obstetrics and Gynecology  Why: Routine PP visit  Contact information:  [address] [address] [phone]                           Patient Instructions:      Diet Adult Regular     Ice to affected area     Lifting restrictions     Pelvic Rest     Notify your health care provider if you experience any of the following:  temperature >100.4     Notify your health care provider if you experience any of the following:  persistent nausea and vomiting or diarrhea     Notify your health care provider if you experience any of the following:  severe uncontrolled pain     Notify your health care provider if you experience any of the following:  redness, tenderness, or signs of infection (pain, " swelling, redness, odor or green/yellow discharge around incision site)     Notify your health care provider if you experience any of the following:  difficulty breathing or increased cough     Notify your health care provider if you experience any of the following:  severe persistent headache     Notify your health care provider if you experience any of the following:  worsening rash     Notify your health care provider if you experience any of the following:  persistent dizziness, light-headedness, or visual disturbances     Notify your health care provider if you experience any of the following:  increased confusion or weakness     Activity as tolerated     Medications:  Current Discharge Medication List        START taking these medications    Details   ibuprofen (ADVIL,MOTRIN) 600 MG tablet Take 1 tablet (600 mg total) by mouth every 6 (six) hours as needed for Pain.  Qty: 60 tablet, Refills: 0           CONTINUE these medications which have NOT CHANGED    Details   levothyroxine (SYNTHROID) 25 MCG tablet Take 1 tablet (25 mcg total) by mouth before breakfast.  Qty: 90 tablet, Refills: 3    Associated Diagnoses: Acquired hypothyroidism      blood sugar diagnostic (CONTOUR TEST STRIPS) Strp Check blood sugar four times daily (fasting and 2 hours after breakfast, lunch, and dinner)  Qty: 100 strip, Refills: 11    Comments: Fill brand that is covered by patient's insurance and compatible with her glucometer  Associated Diagnoses: Diet controlled gestational diabetes mellitus (GDM) in second trimester      blood-glucose meter kit Check blood sugar four times daily (fasting and 2 hours after breakfast, lunch, and dinner)  Qty: 1 each, Refills: 0    Comments: Please fill a meter that is covered by the patient's insurance plan for gestational diabetes.  Associated Diagnoses: Diet controlled gestational diabetes mellitus (GDM) in second trimester      cycloSPORINE (RESTASIS) 0.05 % ophthalmic emulsion Place 1 drop into  both eyes 2 (two) times daily.  Qty: 180 each, Refills: 3    Associated Diagnoses: Dry eye syndrome, bilateral      doxylamine succinate (UNISOM, DOXYLAMINE,) 25 mg tablet Take 1 tablet (25 mg total) by mouth every evening.  Qty: 60 tablet, Refills: 2    Associated Diagnoses: Nausea/vomiting in pregnancy      ferrous sulfate 325 (65 FE) MG EC tablet Take 1 tablet (325 mg total) by mouth once daily.  Qty: 90 tablet, Refills: 3    Associated Diagnoses: Anemia during pregnancy in second trimester      lancets (LANCETS,THIN) 28 gauge Misc Check blood sugar four times daily (fasting and 2 hours after breakfast, lunch, and dinner)  Qty: 100 each, Refills: 11    Comments: Please fill brand that is covered by patient's insurance  Associated Diagnoses: Diet controlled gestational diabetes mellitus (GDM) in second trimester      PNV,calcium 72-iron-folic acid (PRENATAL VITAMIN PLUS LOW IRON) 27 mg iron- 1 mg Tab Take 1 tablet (1 each total) by mouth once daily.  Qty: 30 tablet, Refills: 11      pyridoxine, vitamin B6, (B-6) 25 MG Tab Take 1 tablet (25 mg total) by mouth every evening.  Qty: 60 tablet, Refills: 2    Associated Diagnoses: Nausea/vomiting in pregnancy             Lizzie Butler CNM  Obstetrics  [facility] - [facility] ([place])

## 2025-01-28 NOTE — TELEPHONE ENCOUNTER
Scheduled pt for 6w pp visit on 3/10 [facility]. Pt satisfied w appt    Also informed pt of fasting labs before her appt. Pt verbalized understanding.

## 2025-01-28 NOTE — LACTATION NOTE
"   01/28/25 1100   Breastfeeding Supplementation   Infant Indication for Supplementation maternal request   Breastfeeding Supplementation Type formula;expressed breast milk   Method of Supplementation paced bottle   Equipment Type   Breast Pump Type double electric, hospital grade   Breast Pump Flange Type hard   Breast Pumping   Breast Pumping Interventions other (see comments)  (pumping encouraged if baby does not nurse well or needs supplement)   Breast Pumping double electric breast pump utilized;other (see comments)  (@ bedside)   Community Referrals   Community Referrals outpatient lactation program;pediatric care provider     Patient states she juts finished nursing baby and fed formula supplement after because she did not seem satisfied. Symphony pump @ bedside. Patient states she "tried" pump x1 yesterday. Discussed risks of feeding baby formula; discouraged unless medically indicated. States she is hand expressing after nursing and feeding to baby. Discussed supply and demand principles and signs of milk transfer. Encouraged to keep accurate I&O log, exclusive nursing and to pump if baby requires supplement. Breastfeeding discharge education given.  number written on board. Requested mother to call LC at next nursing for latch assessment/assistance.   "

## 2025-01-29 ENCOUNTER — PATIENT MESSAGE (OUTPATIENT)
Dept: OBSTETRICS AND GYNECOLOGY | Facility: OTHER | Age: 30
End: 2025-01-29
Payer: COMMERCIAL

## 2025-01-29 ENCOUNTER — PATIENT MESSAGE (OUTPATIENT)
Facility: CLINIC | Age: 30
End: 2025-01-29
Payer: COMMERCIAL

## 2025-02-18 ENCOUNTER — PATIENT MESSAGE (OUTPATIENT)
Facility: CLINIC | Age: 30
End: 2025-02-18
Payer: COMMERCIAL

## 2025-02-20 ENCOUNTER — PATIENT MESSAGE (OUTPATIENT)
Facility: CLINIC | Age: 30
End: 2025-02-20

## 2025-02-20 ENCOUNTER — CLINICAL SUPPORT (OUTPATIENT)
Facility: CLINIC | Age: 30
End: 2025-02-20
Payer: COMMERCIAL

## 2025-02-20 VITALS
SYSTOLIC BLOOD PRESSURE: 108 MMHG | HEART RATE: 71 BPM | RESPIRATION RATE: 15 BRPM | TEMPERATURE: 97 F | OXYGEN SATURATION: 98 % | DIASTOLIC BLOOD PRESSURE: 76 MMHG

## 2025-02-20 NOTE — PROGRESS NOTES
Post Visit Nursing Note  Maternal Note  In-Home Visit    Family Oma Consent: Ladonna    Home visit completed 2025. This is the initial visit to the home by a [facility] nurse.     Return Visit Needed: No   (If yes) Return visit date:     Maternal History:    Susan Corona is a 29 y.o. female White, who delivered at 38 W 2D GA via .    Susan Corona experienced the following pregnancy and delivery complications during this most recent pregnancy: Hypothyroidism and GDMA1    Vaccine History:   Immunization History   Administered Date(s) Administered    Influenza - Trivalent - Fluarix, Flulaval, Fluzone, Afluria - PF 10/25/2024    Rsv, Bivalent, Rsvpref (Abrysvo) 2025    Tdap 11/15/2024       Maternal Assessment:    Vital Signs During Home Visit:   /76 (BP Location: Left arm, Patient Position: Sitting)   Pulse 71   Temp 96.8 °F (36 °C) (Tympanic)   Resp 15   LMP 2024 (Exact Date)   SpO2 98%   Breastfeeding Yes   [place]  Depression Scale (EPDS) During Home Visit: 3  Feeding: breast  Relationship Risk: Low   Substance Use Risk: Low  Contraception: condoms  Plan for Additional Children: Undecided    Mother taking PNV and Synthroid. All questions answered.   Home Environment:    Adequate housing/material supplies. Smoke and CO detector present in home. No lead concerns. Family drinks bottled water.    Referrals:    None    Education Materials Provided:    POST BIRTH Warning Signs  Birth Control Options  Smart Snack Guide  Healthy Sleep  Infant Skin Care  Infant Warning Signs  Recommended Immunizations  Infant Bonding  Tummy Time  Infant Crying & Shaken Baby Syndrome  Home Safety (lead, gun storage, smoke/co detectors, medicine safety)  Safe Sleep   Mental Health Disorders   Smoking

## 2025-02-24 ENCOUNTER — PATIENT MESSAGE (OUTPATIENT)
Dept: OBSTETRICS AND GYNECOLOGY | Facility: CLINIC | Age: 30
End: 2025-02-24
Payer: COMMERCIAL

## 2025-02-24 DIAGNOSIS — E03.9 HYPOTHYROIDISM, UNSPECIFIED TYPE: Primary | ICD-10-CM

## 2025-02-27 ENCOUNTER — TELEPHONE (OUTPATIENT)
Dept: OBSTETRICS AND GYNECOLOGY | Facility: CLINIC | Age: 30
End: 2025-02-27
Payer: COMMERCIAL

## 2025-02-27 ENCOUNTER — PATIENT MESSAGE (OUTPATIENT)
Dept: OBSTETRICS AND GYNECOLOGY | Facility: CLINIC | Age: 30
End: 2025-02-27
Payer: COMMERCIAL

## 2025-02-27 NOTE — TELEPHONE ENCOUNTER
Spoke w pt. Relayed all the information that I sent via pt portal. Pt denies fever, chills, discharge w foul odor, and pain unrelieved with medication. She said she moved the baby and sat in an odd position. After that, she felt the suture irritation. Pt verbalized understanding of precautions of [facility].

## 2025-02-27 NOTE — TELEPHONE ENCOUNTER
----- Message from Christiano sent at 2/27/2025  3:57 PM CST -----  Regarding: Self  [phone]  Type:  Sooner Appointment RequestPatient is requesting a sooner appointment.  Patient declined first available appointment listed as well as another facility and provider .  Patient will not accept being placed on the waitlist and is requesting a message be sent to doctor.Name of Caller:  Self When is the first available appointment?  March 6th Symptoms: postpartum, stiches loosened Would the patient rather a call back or a response via [facility]?  Call back Best Call Back Number:  [phone] Additional Information:

## 2025-02-28 ENCOUNTER — PATIENT MESSAGE (OUTPATIENT)
Facility: CLINIC | Age: 30
End: 2025-02-28
Payer: COMMERCIAL

## 2025-03-10 ENCOUNTER — LAB VISIT (OUTPATIENT)
Dept: LAB | Facility: OTHER | Age: 30
End: 2025-03-10
Attending: OBSTETRICS & GYNECOLOGY
Payer: COMMERCIAL

## 2025-03-10 ENCOUNTER — POSTPARTUM VISIT (OUTPATIENT)
Dept: OBSTETRICS AND GYNECOLOGY | Facility: CLINIC | Age: 30
End: 2025-03-10
Payer: COMMERCIAL

## 2025-03-10 VITALS
BODY MASS INDEX: 25.27 KG/M2 | DIASTOLIC BLOOD PRESSURE: 76 MMHG | WEIGHT: 129.44 LBS | SYSTOLIC BLOOD PRESSURE: 104 MMHG

## 2025-03-10 DIAGNOSIS — D69.6 BENIGN GESTATIONAL THROMBOCYTOPENIA, ANTEPARTUM: ICD-10-CM

## 2025-03-10 DIAGNOSIS — O24.410 DIET CONTROLLED GESTATIONAL DIABETES MELLITUS (GDM), ANTEPARTUM: ICD-10-CM

## 2025-03-10 DIAGNOSIS — O99.119 BENIGN GESTATIONAL THROMBOCYTOPENIA, ANTEPARTUM: ICD-10-CM

## 2025-03-10 DIAGNOSIS — E03.9 HYPOTHYROIDISM, UNSPECIFIED TYPE: ICD-10-CM

## 2025-03-10 LAB
BASOPHILS # BLD AUTO: 0.05 K/UL (ref 0–0.2)
BASOPHILS NFR BLD: 0.8 % (ref 0–1.9)
DIFFERENTIAL METHOD BLD: NORMAL
EOSINOPHIL # BLD AUTO: 0.3 K/UL (ref 0–0.5)
EOSINOPHIL NFR BLD: 4.7 % (ref 0–8)
ERYTHROCYTE [DISTWIDTH] IN BLOOD BY AUTOMATED COUNT: 12 % (ref 11.5–14.5)
GLUCOSE SERPL-MCNC: 121 MG/DL
GLUCOSE SERPL-MCNC: 160 MG/DL
GLUCOSE SERPL-MCNC: 84 MG/DL (ref 70–110)
HCT VFR BLD AUTO: 42.3 % (ref 37–48.5)
HGB BLD-MCNC: 14.2 G/DL (ref 12–16)
IMM GRANULOCYTES # BLD AUTO: 0.03 K/UL (ref 0–0.04)
IMM GRANULOCYTES NFR BLD AUTO: 0.5 % (ref 0–0.5)
LYMPHOCYTES # BLD AUTO: 2.4 K/UL (ref 1–4.8)
LYMPHOCYTES NFR BLD: 38.4 % (ref 18–48)
MCH RBC QN AUTO: 29.6 PG (ref 27–31)
MCHC RBC AUTO-ENTMCNC: 33.6 G/DL (ref 32–36)
MCV RBC AUTO: 88 FL (ref 82–98)
MONOCYTES # BLD AUTO: 0.4 K/UL (ref 0.3–1)
MONOCYTES NFR BLD: 6.5 % (ref 4–15)
NEUTROPHILS # BLD AUTO: 3.1 K/UL (ref 1.8–7.7)
NEUTROPHILS NFR BLD: 49.1 % (ref 38–73)
NRBC BLD-RTO: 0 /100 WBC
PLATELET # BLD AUTO: 311 K/UL (ref 150–450)
PMV BLD AUTO: 10.2 FL (ref 9.2–12.9)
RBC # BLD AUTO: 4.79 M/UL (ref 4–5.4)
T4 FREE SERPL-MCNC: 0.95 NG/DL (ref 0.71–1.51)
TSH SERPL DL<=0.005 MIU/L-ACNC: 1.27 UIU/ML (ref 0.4–4)
WBC # BLD AUTO: 6.33 K/UL (ref 3.9–12.7)

## 2025-03-10 PROCEDURE — 99999 PR PBB SHADOW E&M-EST. PATIENT-LVL III: CPT | Mod: PBBFAC,,, | Performed by: OBSTETRICS & GYNECOLOGY

## 2025-03-10 PROCEDURE — 84443 ASSAY THYROID STIM HORMONE: CPT | Performed by: OBSTETRICS & GYNECOLOGY

## 2025-03-10 PROCEDURE — 0503F POSTPARTUM CARE VISIT: CPT | Mod: CPTII,S$GLB,, | Performed by: OBSTETRICS & GYNECOLOGY

## 2025-03-10 PROCEDURE — 85025 COMPLETE CBC W/AUTO DIFF WBC: CPT | Performed by: OBSTETRICS & GYNECOLOGY

## 2025-03-10 PROCEDURE — 36415 COLL VENOUS BLD VENIPUNCTURE: CPT | Performed by: OBSTETRICS & GYNECOLOGY

## 2025-03-10 PROCEDURE — 84439 ASSAY OF FREE THYROXINE: CPT | Performed by: OBSTETRICS & GYNECOLOGY

## 2025-03-10 PROCEDURE — 82951 GLUCOSE TOLERANCE TEST (GTT): CPT | Performed by: OBSTETRICS & GYNECOLOGY

## 2025-03-10 NOTE — PROGRESS NOTES
Chief Complaint   Patient presents with    Postpartum Care       HPI:   29 y.o.  here today for 6 week PP visit. She is s/p  25, admitted with PROM at 38w2d. She delivered a VFI (Eloise) 6#15 oz, Apgars 9/9.  cc. 2nd degree laceration noted with left labial extension was noted and repaired. Pregnancy complicated by A1GDM with good control, gestational thrombocytopenia, and hypothyroidism. Today she is feeling well. Baby with a lot of colic. Normal lochia, denies significant cramping, breast feeding (occasional formula feeding), mood is stable, denies SI/HI, depression, or anhedonia, good support at home. Suture site is sore, worse toward end of the day, but feeling better overall.     Labs / Significant Studies  Pap (2024): NILM     History reviewed. No pertinent past medical history.  History reviewed. No pertinent surgical history.  Current Medications[1]  Review of patient's allergies indicates:   Allergen Reactions    Minoxidil Rash     OB History    Para Term  AB Living   1 1 1   1   SAB IAB Ectopic Multiple Live Births      0 1      # Outcome Date GA Lbr Harshad/2nd Weight Sex Type Anes PTL Lv   1 Term 25 38w2d  3.13 kg (6 lb 14.4 oz) F Vag-Spont EPI  ANNETTE     Social History[2]  Family History   Problem Relation Name Age of Onset    Breast cancer Neg Hx      Colon cancer Neg Hx      Ovarian cancer Neg Hx         Review of Systems   Negative except as in HPI    Physical Exam   Vitals:    03/10/25 0823   BP: 104/76     Body mass index is 25.27 kg/m².    Physical Exam  Constitutional:       General: She is not in acute distress.     Appearance: Normal appearance.     Genitourinary:    Vulva, vagina, uterus, right adnexa, left adnexa and urethral meatus normal.   The external female genitalia was normal.   No external genitalia lesions identified,Genitalia hair distrobution normal .         No vaginal discharge or bleeding in the vagina.    No vaginal prolapse present.     No  vaginal atrophy present.  Right adnexum displays no mass, no tenderness and no fullness. Left adnexum displays no mass, no tenderness and no fullness. Cervix is normal. Cervix exhibits no motion tenderness and no lesion. Uterus consistancy normal and Uerus contour normal  Uterus is not tender and no mass.    Uterus is anteverted.     HENT:      Head: Normocephalic and atraumatic.   Eyes:      Extraocular Movements: Extraocular movements intact.      Pupils: Pupils are equal, round, and reactive to light.   Pulmonary:      Effort: Pulmonary effort is normal.   Abdominal:      General: Abdomen is flat. There is no distension.      Palpations: Abdomen is soft.      Tenderness: There is no abdominal tenderness. There is no guarding or rebound.   Musculoskeletal:         General: Normal range of motion.      Cervical back: Normal range of motion.   Neurological:      General: No focal deficit present.      Mental Status: She is alert and oriented to person, place, and time.   Skin:     General: Skin is warm and dry.   Psychiatric:         Mood and Affect: Mood normal.         Behavior: Behavior normal.         Thought Content: Thought content normal.   Vitals reviewed.        ASSESSMENT:   Problem List[3]    PLAN:  Problem List Items Addressed This Visit          Endocrine    Hypothyroidism    Diet controlled gestational diabetes mellitus (GDM), antepartum    Overview   Fast BS wnl at 89            Obstetric    Benign gestational thrombocytopenia, antepartum    Overview    at admit. 180 PP.  Consider repeat at 6 week PP visit          (normal spontaneous vaginal delivery)    Encounter for postpartum visit - Primary    Current Assessment & Plan   Normal pelvic exam except for granulation tissue present at posterior fourchette. Breastfeeding, occasional formula supplementation. Condoms for contraception, considering LARC. Counseled about risks of unintended pregnancy, safe pregnancy spacing, and continuing PNV.  No signs of PP depression, EPDS 1. Pap up to date.     2 hr GTT, CBC, and TSH today.              Follow up in 1 week (on 3/17/2025) for granulation tissue excision.       Margy Bedolla MD  Department of Obstetrics & Gynecology  [facility]         [1]   Current Outpatient Medications:     ibuprofen (ADVIL,MOTRIN) 600 MG tablet, Take 1 tablet (600 mg total) by mouth every 6 (six) hours as needed for Pain., Disp: 60 tablet, Rfl: 0    levothyroxine (SYNTHROID) 25 MCG tablet, Take 1 tablet (25 mcg total) by mouth before breakfast., Disp: 90 tablet, Rfl: 3    PNV,calcium 72-iron-folic acid (PRENATAL VITAMIN PLUS LOW IRON) 27 mg iron- 1 mg Tab, Take 1 tablet (1 each total) by mouth once daily., Disp: 30 tablet, Rfl: 11    blood sugar diagnostic (CONTOUR TEST STRIPS) Strp, Check blood sugar four times daily (fasting and 2 hours after breakfast, lunch, and dinner) (Patient not taking: Reported on 3/10/2025), Disp: 100 strip, Rfl: 11    blood-glucose meter kit, Check blood sugar four times daily (fasting and 2 hours after breakfast, lunch, and dinner) (Patient not taking: Reported on 3/10/2025), Disp: 1 each, Rfl: 0    cycloSPORINE (RESTASIS) 0.05 % ophthalmic emulsion, Place 1 drop into both eyes 2 (two) times daily. (Patient not taking: Reported on 7/15/2024), Disp: 180 each, Rfl: 3    doxylamine succinate (UNISOM, DOXYLAMINE,) 25 mg tablet, Take 1 tablet (25 mg total) by mouth every evening. (Patient not taking: Reported on 7/15/2024), Disp: 60 tablet, Rfl: 2    ferrous sulfate 325 (65 FE) MG EC tablet, Take 1 tablet (325 mg total) by mouth once daily. (Patient not taking: Reported on 3/10/2025), Disp: 90 tablet, Rfl: 3    lancets (LANCETS,THIN) 28 gauge Misc, Check blood sugar four times daily (fasting and 2 hours after breakfast, lunch, and dinner) (Patient not taking: Reported on 3/10/2025), Disp: 100 each, Rfl: 11    pyridoxine, vitamin B6, (B-6) 25 MG Tab, Take 1 tablet (25 mg total) by mouth every  evening. (Patient not taking: Reported on 3/10/2025), Disp: 60 tablet, Rfl: 2  [2]   Social History  Tobacco Use    Smoking status: Never     Passive exposure: Never    Smokeless tobacco: Never   Substance Use Topics    Alcohol use: Not Currently    Drug use: Never   [3]   Patient Active Problem List  Diagnosis    Initial obstetric visit, first trimester    Hypothyroidism    Abnormal glucose tolerance in pregnancy    Anemia during pregnancy in second trimester    Diet controlled gestational diabetes mellitus (GDM), antepartum    Thyroid disease affecting pregnancy    Benign gestational thrombocytopenia, antepartum     (normal spontaneous vaginal delivery)    Obstetrical laceration    Encounter for postpartum visit

## 2025-03-10 NOTE — ASSESSMENT & PLAN NOTE
Normal pelvic exam except for granulation tissue present at posterior fourchette. Breastfeeding, occasional formula supplementation. Condoms for contraception, considering LARC. Counseled about risks of unintended pregnancy, safe pregnancy spacing, and continuing PNV. No signs of PP depression, EPDS 1. Pap up to date.     2 hr GTT, CBC, and TSH today.

## 2025-03-11 ENCOUNTER — RESULTS FOLLOW-UP (OUTPATIENT)
Dept: OBSTETRICS AND GYNECOLOGY | Facility: CLINIC | Age: 30
End: 2025-03-11

## 2025-03-17 ENCOUNTER — PROCEDURE VISIT (OUTPATIENT)
Dept: OBSTETRICS AND GYNECOLOGY | Facility: CLINIC | Age: 30
End: 2025-03-17
Payer: COMMERCIAL

## 2025-03-17 VITALS — DIASTOLIC BLOOD PRESSURE: 70 MMHG | BODY MASS INDEX: 25.27 KG/M2 | SYSTOLIC BLOOD PRESSURE: 94 MMHG | WEIGHT: 129.44 LBS

## 2025-03-17 DIAGNOSIS — L92.9 GRANULATION TISSUE AT OBSTETRICAL LACERATION SITE: Primary | ICD-10-CM

## 2025-03-17 PROCEDURE — 56605 BIOPSY OF VULVA/PERINEUM: CPT | Mod: S$GLB,,, | Performed by: OBSTETRICS & GYNECOLOGY

## 2025-03-17 NOTE — PROCEDURES
Biopsy (Gynecological)    Date/Time: 3/17/2025 4:00 PM    Performed by: Margy Bedolla MD  Authorized by: Margy Bedolla MD     Patient was prepped and draped in the normal sterile fashion.  Local anesthesia used?: Yes    Anesthesia:  Topical anesthetic  Local anesthetic:  Lidocaine 1% without epinephrine  Anesthetic total (ml):  3    Biopsy Location:  Vulva  Vulva:     # of lesions:  1  Estimated blood loss (cc):  10   Patient tolerated the procedure well with no immediate complications.    The ~1 cm of granulation tissue was identified at the posterior fourchette and lidocaine gel was applied. The area was then prepped with betadine. 1% Lidocaine was injected to achieve anesthesia. After anesthesia was confirmed, the excess tissue was excised sharply.    Hemostasis achieved with pressure and silver nitrate.     Patient tolerated procedure well.     After care instructions provided including no tub baths, douching, or anything in the vagina for 7 days. Wash with warm water and pat dry gently with a clean towel. Patient voiced understanding and all questions answered.    The tissue was not sent to pathology.       Margy Bedolla MD  Department of Obstetrics & Gynecology  [facility]

## 2025-04-01 ENCOUNTER — PATIENT MESSAGE (OUTPATIENT)
Dept: OBSTETRICS AND GYNECOLOGY | Facility: CLINIC | Age: 30
End: 2025-04-01
Payer: COMMERCIAL

## 2025-04-02 ENCOUNTER — PATIENT MESSAGE (OUTPATIENT)
Dept: OBSTETRICS AND GYNECOLOGY | Facility: CLINIC | Age: 30
End: 2025-04-02
Payer: COMMERCIAL

## 2025-04-11 ENCOUNTER — OFFICE VISIT (OUTPATIENT)
Dept: INTERNAL MEDICINE | Facility: CLINIC | Age: 30
End: 2025-04-11
Payer: COMMERCIAL

## 2025-04-11 ENCOUNTER — LAB VISIT (OUTPATIENT)
Dept: LAB | Facility: OTHER | Age: 30
End: 2025-04-11
Attending: FAMILY MEDICINE
Payer: COMMERCIAL

## 2025-04-11 VITALS
OXYGEN SATURATION: 99 % | HEIGHT: 60 IN | WEIGHT: 128.75 LBS | HEART RATE: 75 BPM | DIASTOLIC BLOOD PRESSURE: 80 MMHG | BODY MASS INDEX: 25.28 KG/M2 | SYSTOLIC BLOOD PRESSURE: 104 MMHG

## 2025-04-11 DIAGNOSIS — M79.605 PAIN OF LEFT LOWER EXTREMITY: ICD-10-CM

## 2025-04-11 DIAGNOSIS — M79.605 PAIN OF LEFT LOWER EXTREMITY: Primary | ICD-10-CM

## 2025-04-11 LAB
ALBUMIN SERPL BCP-MCNC: 4.2 G/DL (ref 3.5–5.2)
ALP SERPL-CCNC: 53 UNIT/L (ref 40–150)
ALT SERPL W/O P-5'-P-CCNC: 104 UNIT/L (ref 10–44)
ANION GAP (OHS): 10 MMOL/L (ref 8–16)
AST SERPL-CCNC: 44 UNIT/L (ref 11–45)
BILIRUB SERPL-MCNC: 0.5 MG/DL (ref 0.1–1)
BUN SERPL-MCNC: 14 MG/DL (ref 6–20)
CALCIUM SERPL-MCNC: 9.4 MG/DL (ref 8.7–10.5)
CHLORIDE SERPL-SCNC: 109 MMOL/L (ref 95–110)
CO2 SERPL-SCNC: 21 MMOL/L (ref 23–29)
CREAT SERPL-MCNC: 0.7 MG/DL (ref 0.5–1.4)
FOLATE SERPL-MCNC: 14.8 NG/ML (ref 4–24)
GFR SERPLBLD CREATININE-BSD FMLA CKD-EPI: >60 ML/MIN/1.73/M2
GLUCOSE SERPL-MCNC: 92 MG/DL (ref 70–110)
POTASSIUM SERPL-SCNC: 4 MMOL/L (ref 3.5–5.1)
PROT SERPL-MCNC: 7.9 GM/DL (ref 6–8.4)
SODIUM SERPL-SCNC: 140 MMOL/L (ref 136–145)
VIT B12 SERPL-MCNC: 394 PG/ML (ref 210–950)

## 2025-04-11 PROCEDURE — 1159F MED LIST DOCD IN RCRD: CPT | Mod: CPTII,S$GLB,,

## 2025-04-11 PROCEDURE — 1160F RVW MEDS BY RX/DR IN RCRD: CPT | Mod: CPTII,S$GLB,,

## 2025-04-11 PROCEDURE — 99999 PR PBB SHADOW E&M-EST. PATIENT-LVL III: CPT | Mod: PBBFAC,,,

## 2025-04-11 PROCEDURE — 82607 VITAMIN B-12: CPT

## 2025-04-11 PROCEDURE — 3008F BODY MASS INDEX DOCD: CPT | Mod: CPTII,S$GLB,,

## 2025-04-11 PROCEDURE — 3074F SYST BP LT 130 MM HG: CPT | Mod: CPTII,S$GLB,,

## 2025-04-11 PROCEDURE — 80053 COMPREHEN METABOLIC PANEL: CPT

## 2025-04-11 PROCEDURE — 82746 ASSAY OF FOLIC ACID SERUM: CPT

## 2025-04-11 PROCEDURE — 99213 OFFICE O/P EST LOW 20 MIN: CPT | Mod: S$GLB,,,

## 2025-04-11 PROCEDURE — 3079F DIAST BP 80-89 MM HG: CPT | Mod: CPTII,S$GLB,,

## 2025-04-11 PROCEDURE — 36415 COLL VENOUS BLD VENIPUNCTURE: CPT

## 2025-04-11 NOTE — PROGRESS NOTES
CHIEF COMPLAINT     Chief Complaint   Patient presents with    Foot Pain     Feels like electric shock on foot when applying pressure; ~1 wk       HPI     Susan Corona is a 29 y.o. female who presents for same day visit of foot pain today.    PCP is Frank Louis MD, patient is new to me. Pt consents to AI recording of visit for documentation purposes.     History of Present Illness    CHIEF COMPLAINT:  Patient presents today for foot pain    HISTORY OF PRESENT ILLNESS:  She reports unilateral foot pain that began approximately one week ago after walking in the park. The pain is localized to one side and only occurs with pressure, described as an electric shock sensation. She denies any history of trauma, twisting, or injury to the affected foot.    MEDICAL HISTORY:  She is 12 weeks postpartum.    MEDICATIONS:  She is currently taking  vitamins.            Home Medications:  Prior to Admission medications    Medication Sig Start Date End Date Taking? Authorizing Provider   levothyroxine (SYNTHROID) 25 MCG tablet Take 1 tablet (25 mcg total) by mouth before breakfast. 24  Yes Frank Louis MD   PNV,calcium 72-iron-folic acid (PRENATAL VITAMIN PLUS LOW IRON) 27 mg iron- 1 mg Tab Take 1 tablet (1 each total) by mouth once daily. 24 Yes Frank Louis MD   blood sugar diagnostic (CONTOUR TEST STRIPS) Strp Check blood sugar four times daily (fasting and 2 hours after breakfast, lunch, and dinner)  Patient not taking: Reported on 2025   Margy Bedolla MD   blood-glucose meter kit Check blood sugar four times daily (fasting and 2 hours after breakfast, lunch, and dinner)  Patient not taking: Reported on 2025   Margy Bedolla MD   cycloSPORINE (RESTASIS) 0.05 % ophthalmic emulsion Place 1 drop into both eyes 2 (two) times daily.  Patient not taking: Reported on 2025 4/10/24 4/10/25  Dori Jhaveri, SONNY   doxylamine  succinate (UNISOM, DOXYLAMINE,) 25 mg tablet Take 1 tablet (25 mg total) by mouth every evening.  Patient not taking: Reported on 4/11/2025 6/18/24   Matthias Benitez MD   ferrous sulfate 325 (65 FE) MG EC tablet Take 1 tablet (325 mg total) by mouth once daily.  Patient not taking: Reported on 4/11/2025 11/1/24   Margy Bedolla MD   ibuprofen (ADVIL,MOTRIN) 600 MG tablet Take 1 tablet (600 mg total) by mouth every 6 (six) hours as needed for Pain.  Patient not taking: Reported on 4/11/2025 1/28/25   Lizzie Butler CNM   lancets (LANCETS,THIN) 28 gauge Misc Check blood sugar four times daily (fasting and 2 hours after breakfast, lunch, and dinner)  Patient not taking: Reported on 4/11/2025 11/4/24   Margy Bedolla MD   pyridoxine, vitamin B6, (B-6) 25 MG Tab Take 1 tablet (25 mg total) by mouth every evening.  Patient not taking: Reported on 1/3/2025 6/18/24   Matthias Benitez MD     Health Maintainence:   Immunizations:  Health Maintenance         Date Due Completion Date    COVID-19 Vaccine (1 - 2024-25 season) Never done ---    Pap Smear 06/17/2027 6/17/2024    TETANUS VACCINE 11/15/2034 11/15/2024    RSV Vaccine (Age 60+ and Pregnant patients) (1 - 1-dose 75+ series) 11/18/2070 1/13/2025             PHYSICAL EXAM     /80 (BP Location: Left arm, Patient Position: Sitting)   Pulse 75   Ht 5' (1.524 m)   Wt 58.4 kg (128 lb 12 oz)   LMP 05/06/2024 (Exact Date)   SpO2 99%   Breastfeeding Yes   BMI 25.14 kg/m²     Physical Exam  Constitutional:       General: She is not in acute distress.     Appearance: Normal appearance. She is not toxic-appearing.   HENT:      Head: Normocephalic and atraumatic.      Right Ear: External ear normal.      Left Ear: External ear normal.      Nose: Nose normal.      Mouth/Throat:      Mouth: Mucous membranes are moist.   Eyes:      Extraocular Movements: Extraocular movements intact.   Cardiovascular:      Rate and Rhythm: Normal rate and regular rhythm.       Pulses: Normal pulses.      Heart sounds: Normal heart sounds.   Pulmonary:      Effort: Pulmonary effort is normal. No respiratory distress.   Abdominal:      General: Abdomen is flat.      Palpations: Abdomen is soft.      Tenderness: There is no abdominal tenderness.   Musculoskeletal:      Cervical back: Normal range of motion and neck supple.      Comments: TTP to lateral aspect of left foot   Skin:     General: Skin is warm.      Findings: No bruising or erythema.   Neurological:      General: No focal deficit present.      Mental Status: She is alert.   Psychiatric:         Mood and Affect: Mood normal.         LABS     Lab Results   Component Value Date    HGBA1C 5.4 05/16/2024     CMP  Sodium   Date Value Ref Range Status   04/11/2025 140 136 - 145 mmol/L Final   05/16/2024 141 136 - 145 mmol/L Final     Potassium   Date Value Ref Range Status   04/11/2025 4.0 3.5 - 5.1 mmol/L Final   05/16/2024 4.0 3.5 - 5.1 mmol/L Final     Chloride   Date Value Ref Range Status   04/11/2025 109 95 - 110 mmol/L Final   05/16/2024 108 95 - 110 mmol/L Final     CO2   Date Value Ref Range Status   04/11/2025 21 (L) 23 - 29 mmol/L Final   05/16/2024 22 (L) 23 - 29 mmol/L Final     Glucose   Date Value Ref Range Status   05/16/2024 68 (L) 70 - 110 mg/dL Final     BUN   Date Value Ref Range Status   04/11/2025 14 6 - 20 mg/dL Final     Creatinine   Date Value Ref Range Status   04/11/2025 0.7 0.5 - 1.4 mg/dL Final     Calcium   Date Value Ref Range Status   04/11/2025 9.4 8.7 - 10.5 mg/dL Final   05/16/2024 9.3 8.7 - 10.5 mg/dL Final     Total Protein   Date Value Ref Range Status   05/16/2024 7.8 6.0 - 8.4 g/dL Final     Albumin   Date Value Ref Range Status   04/11/2025 4.2 3.5 - 5.2 g/dL Final   05/16/2024 4.3 3.5 - 5.2 g/dL Final     Total Bilirubin   Date Value Ref Range Status   05/16/2024 0.6 0.1 - 1.0 mg/dL Final     Comment:     For infants and newborns, interpretation of results should be based  on gestational age,  weight and in agreement with clinical  observations.    Premature Infant recommended reference ranges:  Up to 24 hours.............<8.0 mg/dL  Up to 48 hours............<12.0 mg/dL  3-5 days..................<15.0 mg/dL  6-29 days.................<15.0 mg/dL       Bilirubin Total   Date Value Ref Range Status   04/11/2025 0.5 0.1 - 1.0 mg/dL Final     Comment:     For infants and newborns, interpretation of results should be based   on gestational age, weight and in agreement with clinical   observations.    Premature Infant recommended reference ranges:   0-24 hours:  <8.0 mg/dL   24-48 hours: <12.0 mg/dL   3-5 days:    <15.0 mg/dL   6-29 days:   <15.0 mg/dL     Alkaline Phosphatase   Date Value Ref Range Status   05/16/2024 31 (L) 55 - 135 U/L Final     ALP   Date Value Ref Range Status   04/11/2025 53 40 - 150 unit/L Final     AST   Date Value Ref Range Status   04/11/2025 44 11 - 45 unit/L Final   05/16/2024 19 10 - 40 U/L Final     ALT   Date Value Ref Range Status   04/11/2025 104 (H) 10 - 44 unit/L Final   05/16/2024 21 10 - 44 U/L Final     Anion Gap   Date Value Ref Range Status   04/11/2025 10 8 - 16 mmol/L Final     Lab Results   Component Value Date    WBC 6.33 03/10/2025    HGB 14.2 03/10/2025    HCT 42.3 03/10/2025    MCV 88 03/10/2025     03/10/2025     Lab Results   Component Value Date    CHOL 258 (H) 05/16/2024     Lab Results   Component Value Date    HDL 37 (L) 05/16/2024     Lab Results   Component Value Date    LDLCALC 162.4 (H) 05/16/2024     Lab Results   Component Value Date    TRIG 293 (H) 05/16/2024     Lab Results   Component Value Date    CHOLHDL 14.3 (L) 05/16/2024     Lab Results   Component Value Date    TSH 1.267 03/10/2025       ASSESSMENT/PLAN   Assessment & Plan    Considered postpartum neuropathy as potential cause for foot pain, given timing (12 weeks postpartum) and nature of symptoms.  Assessed for tendonitis as primary differential diagnosis based on localized pain and  ""electric shock" sensation upon palpation.  Ruled out epidural side effects as unlikely cause due to time elapsed since administration.  Determined further neurological testing (EMG) not immediately necessary, opting for conservative management first.    PLAN SUMMARY:  Ordered labs to check for metabolic causes  Recommend OTC Voltaren gel for foot tenderness  Consider further neurologic testing if symptoms persist  Follow up in 2 weeks if symptoms worsen or do not resolve  Contact the office if pain or shocking sensation intensifies    Follow up HELGA         Susan was seen today for foot pain.    Diagnoses and all orders for this visit:    Pain of left lower extremity  -     Vitamin B12; Future  -     FOLATE; Future  -     Comprehensive Metabolic Panel; Future          Evan Govea MD   Department of Internal Medicine - [facility]  2:06 PM      "

## 2025-04-14 ENCOUNTER — PATIENT MESSAGE (OUTPATIENT)
Dept: INTERNAL MEDICINE | Facility: CLINIC | Age: 30
End: 2025-04-14
Payer: COMMERCIAL

## 2025-04-14 DIAGNOSIS — R74.01 ELEVATED ALT MEASUREMENT: Primary | ICD-10-CM

## 2025-04-15 ENCOUNTER — LAB VISIT (OUTPATIENT)
Dept: LAB | Facility: OTHER | Age: 30
End: 2025-04-15
Attending: FAMILY MEDICINE
Payer: COMMERCIAL

## 2025-04-15 DIAGNOSIS — R74.01 ELEVATED ALT MEASUREMENT: ICD-10-CM

## 2025-04-15 LAB
ALBUMIN SERPL BCP-MCNC: 4.3 G/DL (ref 3.5–5.2)
ALP SERPL-CCNC: 61 UNIT/L (ref 40–150)
ALT SERPL W/O P-5'-P-CCNC: 96 UNIT/L (ref 10–44)
AST SERPL-CCNC: 38 UNIT/L (ref 11–45)
BILIRUB DIRECT SERPL-MCNC: 0.2 MG/DL (ref 0.1–0.3)
BILIRUB SERPL-MCNC: 0.6 MG/DL (ref 0.1–1)
HAV IGM SERPL QL IA: NORMAL
HBV CORE IGM SERPL QL IA: NORMAL
HBV SURFACE AG SERPL QL IA: NORMAL
HCV AB SERPL QL IA: NORMAL
PROT SERPL-MCNC: 8 GM/DL (ref 6–8.4)

## 2025-04-15 PROCEDURE — 80074 ACUTE HEPATITIS PANEL: CPT

## 2025-04-15 PROCEDURE — 82040 ASSAY OF SERUM ALBUMIN: CPT

## 2025-04-15 PROCEDURE — 36415 COLL VENOUS BLD VENIPUNCTURE: CPT

## 2025-04-17 ENCOUNTER — RESULTS FOLLOW-UP (OUTPATIENT)
Dept: INTERNAL MEDICINE | Facility: CLINIC | Age: 30
End: 2025-04-17

## 2025-04-17 ENCOUNTER — PATIENT MESSAGE (OUTPATIENT)
Dept: INTERNAL MEDICINE | Facility: CLINIC | Age: 30
End: 2025-04-17
Payer: COMMERCIAL

## 2025-04-21 DIAGNOSIS — R74.01 ELEVATED ALT MEASUREMENT: Primary | ICD-10-CM

## 2025-04-30 ENCOUNTER — HOSPITAL ENCOUNTER (OUTPATIENT)
Dept: RADIOLOGY | Facility: OTHER | Age: 30
Discharge: HOME | End: 2025-04-30
Payer: COMMERCIAL

## 2025-04-30 DIAGNOSIS — R74.01 ELEVATED ALT MEASUREMENT: ICD-10-CM

## 2025-04-30 PROCEDURE — 76705 ECHO EXAM OF ABDOMEN: CPT | Mod: 26,,, | Performed by: RADIOLOGY

## 2025-04-30 PROCEDURE — 76705 ECHO EXAM OF ABDOMEN: CPT | Mod: TC

## 2025-05-01 ENCOUNTER — RESULTS FOLLOW-UP (OUTPATIENT)
Dept: INTERNAL MEDICINE | Facility: CLINIC | Age: 30
End: 2025-05-01
Payer: COMMERCIAL

## 2025-05-01 DIAGNOSIS — K76.0 FATTY LIVER: Primary | ICD-10-CM

## 2025-08-04 ENCOUNTER — TELEPHONE (OUTPATIENT)
Dept: DERMATOLOGY | Facility: CLINIC | Age: 30
End: 2025-08-04
Payer: COMMERCIAL